# Patient Record
Sex: MALE | Race: WHITE | NOT HISPANIC OR LATINO | URBAN - METROPOLITAN AREA
[De-identification: names, ages, dates, MRNs, and addresses within clinical notes are randomized per-mention and may not be internally consistent; named-entity substitution may affect disease eponyms.]

---

## 2017-09-22 ENCOUNTER — EMERGENCY (EMERGENCY)
Facility: HOSPITAL | Age: 62
LOS: 0 days | Discharge: HOME | End: 2017-09-22

## 2017-09-22 DIAGNOSIS — M25.551 PAIN IN RIGHT HIP: ICD-10-CM

## 2017-09-22 DIAGNOSIS — M54.5 LOW BACK PAIN: ICD-10-CM

## 2017-09-22 DIAGNOSIS — Z90.49 ACQUIRED ABSENCE OF OTHER SPECIFIED PARTS OF DIGESTIVE TRACT: ICD-10-CM
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
## 2017-09-22 DIAGNOSIS — M25.561 PAIN IN RIGHT KNEE: ICD-10-CM
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
## 2019-08-14 PROBLEM — Z00.00 ENCOUNTER FOR PREVENTIVE HEALTH EXAMINATION: Status: ACTIVE | Noted: 2019-08-14

## 2019-08-20 ENCOUNTER — APPOINTMENT (OUTPATIENT)
Dept: SURGERY | Facility: CLINIC | Age: 64
End: 2019-08-20
Payer: COMMERCIAL

## 2019-08-20 VITALS
DIASTOLIC BLOOD PRESSURE: 75 MMHG | WEIGHT: 165 LBS | HEIGHT: 67 IN | BODY MASS INDEX: 25.9 KG/M2 | SYSTOLIC BLOOD PRESSURE: 125 MMHG

## 2019-08-20 DIAGNOSIS — Z86.39 PERSONAL HISTORY OF OTHER ENDOCRINE, NUTRITIONAL AND METABOLIC DISEASE: ICD-10-CM

## 2019-08-20 DIAGNOSIS — R10.32 LEFT LOWER QUADRANT PAIN: ICD-10-CM

## 2019-08-20 PROCEDURE — 99243 OFF/OP CNSLTJ NEW/EST LOW 30: CPT

## 2019-08-20 NOTE — ASSESSMENT
[FreeTextEntry1] : After examination patient was explained about weakness in the left side. Because this symptoms started after exercising patient was advised local care and anti-inflammatory treatment. The patient continues to have problem possibly due affording CAT scan explained to the patient as well. Patient understands it and will either call or return to the office if he is still symptomatic after a while.

## 2019-08-20 NOTE — HISTORY OF PRESENT ILLNESS
[de-identified] : Patient presents to the office with a history of left groin pain after exercising. He had right inguinal hernia surgery done by me and Linda. He also had a left inguinal hernia surgery prior to that.

## 2019-08-20 NOTE — CONSULT LETTER
[Dear  ___] : Dear  [unfilled], [Consult Letter:] : I had the pleasure of evaluating your patient, [unfilled]. [Please see my note below.] : Please see my note below. [Consult Closing:] : Thank you very much for allowing me to participate in the care of this patient.  If you have any questions, please do not hesitate to contact me. [Sincerely,] : Sincerely, [FreeTextEntry3] : Toby Wyatt MD, FACS\par [facility]\[place] [address]\par

## 2019-08-20 NOTE — PHYSICAL EXAM
[No Rash or Lesion] : No rash or lesion [Alert] : alert [Oriented to Person] : oriented to person [Oriented to Place] : oriented to place [Calm] : calm [Oriented to Time] : oriented to time [de-identified] : Left lower quadrant tenderness in the area of the internal ring [de-identified] : normal [de-identified] : No evidence of recurrence of hernia on the right side. Left inguinal region patient has weakness at the internal ring region and some tenderness on coughing. No definite evidence of hernia.

## 2020-02-21 ENCOUNTER — EMERGENCY (EMERGENCY)
Facility: HOSPITAL | Age: 65
LOS: 0 days | Discharge: HOME | End: 2020-02-21
Attending: EMERGENCY MEDICINE | Admitting: EMERGENCY MEDICINE
Payer: COMMERCIAL

## 2020-02-21 VITALS
WEIGHT: 164.91 LBS | DIASTOLIC BLOOD PRESSURE: 73 MMHG | TEMPERATURE: 97 F | HEART RATE: 68 BPM | SYSTOLIC BLOOD PRESSURE: 127 MMHG | OXYGEN SATURATION: 98 % | RESPIRATION RATE: 19 BRPM

## 2020-02-21 DIAGNOSIS — M79.671 PAIN IN RIGHT FOOT: ICD-10-CM

## 2020-02-21 DIAGNOSIS — L02.415 CUTANEOUS ABSCESS OF RIGHT LOWER LIMB: ICD-10-CM

## 2020-02-21 DIAGNOSIS — Z48.00 ENCOUNTER FOR CHANGE OR REMOVAL OF NONSURGICAL WOUND DRESSING: ICD-10-CM

## 2020-02-21 PROCEDURE — 10060 I&D ABSCESS SIMPLE/SINGLE: CPT

## 2020-02-21 PROCEDURE — 99283 EMERGENCY DEPT VISIT LOW MDM: CPT | Mod: 25

## 2020-02-21 RX ORDER — CEPHALEXIN 500 MG
1 CAPSULE ORAL
Qty: 40 | Refills: 0
Start: 2020-02-21 | End: 2020-03-01

## 2020-02-21 NOTE — ED PROVIDER NOTE - PROGRESS NOTE DETAILS
abscess drained with improvement of patient symptoms. patient educated on signs and symptoms to be aware of that should prompt return to the ER. Patient verbalizes understanding. sent rx keflex to pharmacy, educated on importance of starting/completing abx.

## 2020-02-21 NOTE — ED PROVIDER NOTE - NS ED ROS FT
Review of Systems:  	•	CONSTITUTIONAL: no fever, no diaphoresis, no chills  	•	SKIN: +abscess to R foot between 4/5th digit, surrounding redness, no swelling/warmth   	•	HEMATOLOGIC: no bleeding, no bruising  	•	MUSCULOSKELETAL: no joint redness, no joint swelling   	•	NEUROLOGIC: no RLE weakness/numbness/paresthesias

## 2020-02-21 NOTE — ED PROVIDER NOTE - OBJECTIVE STATEMENT
64 year old male, no past medical history, who presents with abscess between R 4th and 5th digit with surrounding redness x4 days. Patient saw PMD who started patient on abx, however, symptoms persisted. Patient states he scratched in between toes, which triggered current symptoms. No fever, chills, foot pain/swelling, ankle pain. Patient ambulating without difficulty.

## 2020-02-21 NOTE — ED PROVIDER NOTE - ATTENDING CONTRIBUTION TO CARE
Pt here for 4d of pain to R foot between 4th and 5h toes.  Originally was athlete's foot but then scratched it and now oozing some pus.  Was on Augmentin and now Clindamycin but when PMD heard oozing pus, asked him to come to ED.    Exam: abscess between toes draining pus, no bony tenderness, no warmth, scant erythema around abscess, no midfoot tenderness, no pedal edema, NAD  Plan: drainage of abscess, more appropriate skin abx coverage

## 2020-02-21 NOTE — ED PROVIDER NOTE - PHYSICAL EXAMINATION
CONST: Well appearing in NAD  MS: Normal ROM in all extremities. FROM of RLE at metatarsals, ankle. pulses 2+. no calf tenderness   SKIN: RLE: small drainable abscess between 4th and 5th metatarsal, serosanguinous drainage appreciated. surrounding erythema, no warmth. no surrounding fluctuance/induration  NEURO: A&Ox3, No focal deficits. Strength 5/5 LE with no sensory deficits. Steady gait

## 2020-02-21 NOTE — ED PROVIDER NOTE - PATIENT PORTAL LINK FT
You can access the FollowMyHealth Patient Portal offered by [facility] by registering at the following website: [email or web address]. By joining [employer]’s FollowMyHealth portal, you will also be able to view your health information using other applications (apps) compatible with our system.

## 2020-02-21 NOTE — ED PROVIDER NOTE - CARE PROVIDER_API CALL
Christy Egan)  Geriatric Medicine; Internal Medicine  [address] [place] [id number]  Phone: [phone]  Fax: [phone]  Established Patient  Follow Up Time: 1-3 Days

## 2021-06-18 NOTE — ED ADULT TRIAGE NOTE - NS ED TRIAGE HISTORIAN
Patient Metronidazole Pregnancy And Lactation Text: This medication is Pregnancy Category B and considered safe during pregnancy.  It is also excreted in breast milk.

## 2022-10-25 ENCOUNTER — APPOINTMENT (OUTPATIENT)
Dept: UROLOGY | Facility: CLINIC | Age: 67
End: 2022-10-25

## 2022-10-28 ENCOUNTER — APPOINTMENT (OUTPATIENT)
Dept: UROLOGY | Facility: CLINIC | Age: 67
End: 2022-10-28

## 2022-10-28 VITALS — HEIGHT: 66 IN | WEIGHT: 157 LBS | BODY MASS INDEX: 25.23 KG/M2 | TEMPERATURE: 98 F | RESPIRATION RATE: 16 BRPM

## 2022-10-28 DIAGNOSIS — E78.00 PURE HYPERCHOLESTEROLEMIA, UNSPECIFIED: ICD-10-CM

## 2022-10-28 PROCEDURE — 99204 OFFICE O/P NEW MOD 45 MIN: CPT

## 2022-10-28 RX ORDER — PAPAVERINE HYDROCHLORIDE 30 MG/ML
30 INJECTION, SOLUTION INTRAVENOUS
Qty: 0.5 | Refills: 0 | Status: ACTIVE | COMMUNITY
Start: 2022-10-28 | End: 1900-01-01

## 2022-10-28 NOTE — HISTORY OF PRESENT ILLNESS
[FreeTextEntry1] : This is a 67 year male who history of ED -- injury to penis during sex 20 years ago -- had peyronies but resolved\par \par takes 100mg sildenafil -- erection 6/10\par wihtout 2/10\par \par tadalafil didn’t work\par \par sex drive is normal\par \par Oct  2022\par PSA- 4.4\par \par PSA was 3.3 last year\par \par prostatitis as a young man

## 2022-12-09 LAB
APPEARANCE: CLEAR
BILIRUBIN URINE: NEGATIVE
BLOOD URINE: NEGATIVE
COLOR: YELLOW
GLUCOSE QUALITATIVE U: NEGATIVE
KETONES URINE: NEGATIVE
LEUKOCYTE ESTERASE URINE: NEGATIVE
NITRITE URINE: NEGATIVE
PH URINE: 6.5
PROTEIN URINE: NORMAL
PSA FREE FLD-MCNC: 14 %
PSA FREE SERPL-MCNC: 0.54 NG/ML
PSA SERPL-MCNC: 3.85 NG/ML
SPECIFIC GRAVITY URINE: 1.03
UROBILINOGEN URINE: NORMAL

## 2022-12-16 ENCOUNTER — APPOINTMENT (OUTPATIENT)
Dept: UROLOGY | Facility: CLINIC | Age: 67
End: 2022-12-16

## 2022-12-16 VITALS
WEIGHT: 160 LBS | HEIGHT: 66 IN | BODY MASS INDEX: 25.71 KG/M2 | HEART RATE: 66 BPM | SYSTOLIC BLOOD PRESSURE: 128 MMHG | DIASTOLIC BLOOD PRESSURE: 68 MMHG

## 2022-12-16 PROCEDURE — 99213 OFFICE O/P EST LOW 20 MIN: CPT

## 2022-12-16 RX ORDER — PAPAVERINE HYDROCHLORIDE 30 MG/ML
30 INJECTION, SOLUTION INTRAVENOUS
Qty: 0.5 | Refills: 0 | Status: ACTIVE | COMMUNITY
Start: 2022-12-16 | End: 1900-01-01

## 2022-12-16 NOTE — HISTORY OF PRESENT ILLNESS
[FreeTextEntry1] : \par This is a 67 year male who history of ED -- injury to penis during sex 20 years ago -- had peyronies but resolved\par \par takes 100mg sildenafil -- erection 6/10\par wihtout 2/10\par \par tadalafil didn’t work\par \par sex drive is normal\par \par Oct 2022\par PSA- 4.4\par \par PSA was 3.3 last year\par \par prostatitis as a young man. \par \par  oct 2022\par Culture - Urine;\par PSA Profile - Total = 3.85\par Urinalysis; neg nit\par \par told to increase sildenafil to 150mg -- improved\par still wishes to try trimx but can't today \par

## 2022-12-16 NOTE — ASSESSMENT
[FreeTextEntry1] : \par This is a 67 year male who history of ED -- injury to penis during sex 20 years ago -- had peyronies but resolved\par \par takes 100mg sildenafil -- erection 6/10\par wihtout 2/10\par \par tadalafil didn’t work\par \par sex drive is normal\par \par Oct 2022\par PSA- 4.4\par \par PSA was 3.3 last year\par \par prostatitis as a young man. \par \par  oct 2022\par Culture - Urine;\par PSA Profile - Total = 3.85\par Urinalysis; neg nit\par \par told to increase sildenafil to 150mg -- improved\par still wishes to try trimx but can't today

## 2023-02-17 ENCOUNTER — APPOINTMENT (OUTPATIENT)
Dept: UROLOGY | Facility: CLINIC | Age: 68
End: 2023-02-17
Payer: COMMERCIAL

## 2023-02-17 VITALS
BODY MASS INDEX: 25.39 KG/M2 | SYSTOLIC BLOOD PRESSURE: 120 MMHG | HEART RATE: 65 BPM | DIASTOLIC BLOOD PRESSURE: 70 MMHG | WEIGHT: 158 LBS | HEIGHT: 66 IN

## 2023-02-17 DIAGNOSIS — R97.20 ELEVATED PROSTATE, SPECIFIC ANTIGEN [PSA]: ICD-10-CM

## 2023-02-17 DIAGNOSIS — N52.9 MALE ERECTILE DYSFUNCTION, UNSPECIFIED: ICD-10-CM

## 2023-02-17 PROCEDURE — 99215 OFFICE O/P EST HI 40 MIN: CPT

## 2023-02-17 NOTE — PHYSICAL EXAM
[General Appearance - In No Acute Distress] : no acute distress [Penis Abnormality] : normal circumcised penis [] : no respiratory distress [Oriented To Time, Place, And Person] : oriented to person, place, and time [Normal Station and Gait] : the gait and station were normal for the patient's age

## 2023-02-28 PROBLEM — N52.9 ERECTILE DYSFUNCTION: Status: ACTIVE | Noted: 2022-10-28

## 2023-02-28 PROBLEM — R97.20 ELEVATED PROSTATE SPECIFIC ANTIGEN (PSA): Status: ACTIVE | Noted: 2022-10-28

## 2023-02-28 NOTE — HISTORY OF PRESENT ILLNESS
[FreeTextEntry1] : Patient is a 67-year-old male with history of erectile dysfunction.  He had injury to penis during sex 20 years ago and has history of Peyronie's disease.\par \par Patient is currently managed on sildenafil 100 mg with moderate erectile response.  He does require the use of constriction band to maintain erection.  Libido is normal.\par \par He presents to office today to learn how to inject Trimix.  He was educated on importance of hygiene, educated on how to prep medication, and shown proper technique of injecting medication intracavernosal.\par Today patient injected Trimix 30 – 1 – 10 0.2 mL.  Skin prepped prior to injection and pressure held after injection.  Patient tolerated injection well.\par \par History of elevated PSA in October 2022 4.4 ng/mL with repeat of 3.85 ng/mL.  He is due for repeat PSA in October 2023.

## 2023-02-28 NOTE — ASSESSMENT
[FreeTextEntry1] : 67-year-old with erectile dysfunction currently managed on sildenafil 150 mg and constriction band.  Patient states that he does get moderate improvement in erection but is unable to maintain erection longer than 10 minutes.  He presents today to try Trimix injection.  Patient injected himself today right side intracavernosal with 0.2 mL of Trimix 30 – 1 – 10.  15 minutes postinjection patient had noticeable fullness, no firm erection.\par \par Discussed prescribed medication for at home and starting at 0.3 mL.  Patient can increase by 0.05 mL as needed.  Emergency precautions reviewed including painful erections and erections that last longer than 4 hours.\par Patient also understands that he should not inject more than every 3 days as needed and he should alternate sides of injection.\par After discussion, including cost of medication, patient would like to discuss further with his wife prior to a prescription being sent to pharmacy.\par \par We will set preliminary appointment for 6 to 8 weeks if patient does want a prescription so that we may assess his response.\par If patient does not want a prescription he understands he is due for repeat PSA in October 2023.

## 2023-04-14 ENCOUNTER — APPOINTMENT (OUTPATIENT)
Dept: UROLOGY | Facility: CLINIC | Age: 68
End: 2023-04-14

## 2024-01-25 ENCOUNTER — APPOINTMENT (OUTPATIENT)
Dept: NEUROLOGY | Facility: CLINIC | Age: 69
End: 2024-01-25
Payer: COMMERCIAL

## 2024-01-25 VITALS
WEIGHT: 162 LBS | HEIGHT: 66 IN | BODY MASS INDEX: 26.03 KG/M2 | SYSTOLIC BLOOD PRESSURE: 128 MMHG | HEART RATE: 67 BPM | DIASTOLIC BLOOD PRESSURE: 73 MMHG

## 2024-01-25 PROCEDURE — 99204 OFFICE O/P NEW MOD 45 MIN: CPT

## 2024-01-25 RX ORDER — GABAPENTIN 300 MG/1
300 CAPSULE ORAL 3 TIMES DAILY
Qty: 90 | Refills: 2 | Status: ACTIVE | COMMUNITY
Start: 2024-01-25 | End: 1900-01-01

## 2024-02-08 ENCOUNTER — APPOINTMENT (OUTPATIENT)
Dept: NEUROLOGY | Facility: CLINIC | Age: 69
End: 2024-02-08
Payer: COMMERCIAL

## 2024-02-08 PROCEDURE — 95911 NRV CNDJ TEST 9-10 STUDIES: CPT

## 2024-02-08 PROCEDURE — 95886 MUSC TEST DONE W/N TEST COMP: CPT

## 2024-02-29 ENCOUNTER — APPOINTMENT (OUTPATIENT)
Dept: NEUROLOGY | Facility: CLINIC | Age: 69
End: 2024-02-29
Payer: COMMERCIAL

## 2024-02-29 VITALS
SYSTOLIC BLOOD PRESSURE: 126 MMHG | DIASTOLIC BLOOD PRESSURE: 64 MMHG | WEIGHT: 162 LBS | HEART RATE: 63 BPM | BODY MASS INDEX: 26.03 KG/M2 | HEIGHT: 66 IN

## 2024-02-29 DIAGNOSIS — M54.12 RADICULOPATHY, CERVICAL REGION: ICD-10-CM

## 2024-02-29 PROCEDURE — 99214 OFFICE O/P EST MOD 30 MIN: CPT

## 2024-02-29 NOTE — ASSESSMENT
[FreeTextEntry1] : Bilateral carpal tunnel syndrome-worse on the left -Patient is being referred to an orthopedic hand specialist to trial carpal tunnel steroid injections.   Cervical spondylosis -physical therapy -We discussed the possibility of seeing a neurosurgeon for the spondylosis causing cord impingement  Total clinician time spent  is  31 minutes including preparing to see the patient, obtaining and/or reviewing and confirming history, performing a medically necessary and appropriate examination, counseling and educating the patient and/or family, documenting clinical information in the HER and communicating and/or referring to other healthcare professionals.   I, Daina Butcher, attest that this documentation has been prepared under the direction and in the presence of Provider Juan Mendosa DO  Thank you for allowing me to assist in the management of this patient.  Juan Mendosa DO Board Certified, Neurology.

## 2024-02-29 NOTE — PHYSICAL EXAM
[Person] : oriented to person [Place] : oriented to place [Time] : oriented to time [Concentration Intact] : normal concentrating ability [Naming Objects] : no difficulty naming common objects [Visual Intact] : visual attention was ~T not ~L decreased [Repeating Phrases] : no difficulty repeating a phrase [Writing A Sentence] : no difficulty writing a sentence [Fluency] : fluency intact [Comprehension] : comprehension intact [Reading] : reading intact [Past History] : adequate knowledge of personal past history [Cranial Nerves Optic (II)] : visual acuity intact bilaterally,  visual fields full to confrontation, pupils equal round and reactive to light [Cranial Nerves Oculomotor (III)] : extraocular motion intact [Cranial Nerves Trigeminal (V)] : facial sensation intact symmetrically [Cranial Nerves Facial (VII)] : face symmetrical [Cranial Nerves Accessory (XI - Cranial And Spinal)] : head turning and shoulder shrug symmetric [Cranial Nerves Glossopharyngeal (IX)] : tongue and palate midline [Cranial Nerves Vestibulocochlear (VIII)] : hearing was intact bilaterally [Motor Tone] : muscle tone was normal in all four extremities [Cranial Nerves Hypoglossal (XII)] : there was no tongue deviation with protrusion [No Muscle Atrophy] : normal bulk in all four extremities [Motor Strength] : muscle strength was normal in all four extremities [Balance] : balance was intact [Abnormal Walk] : normal gait [2+] : Ankle jerk left 2+ [Tremor] : no tremor present [Past-pointing] : there was no past-pointing [Plantar Reflex Right Only] : normal on the right [Plantar Reflex Left Only] : normal on the left [FreeTextEntry7] : Positive Phalen sign at left wrist

## 2024-02-29 NOTE — HISTORY OF PRESENT ILLNESS
[FreeTextEntry1] : ORIGINAL PRESENTATION: It's a pleasure to see Mr. GABBI CARDOSO In the office today. He is a 68-year-old man who works as the  at the [employer] who presents to the office today for the evaluation of left hand pain.  He reports that this has been going on for over 2 years.  Initially he was diagnosed with bilateral carpal tunnel syndrome clinically without the use of EMG/NCS.  He went for surgery in the right hand which did take away all his symptoms, however surgery in the left wrist did not help him at all.  He continues to have burning sensation and tingling especially at night that wakes him up.  Wearing a wrist brace did not help at all either.  He has noticed simple changing position from standing to sitting or bending his neck can trigger the symptoms in his hands as well and initially he did have all of the fingers affected but the pinky has gotten better since and only the first 3 and half fingers are numb and painful.  He denies any weakness, urinary/bowel incontinence.  He was sent for x-ray of the cervical spine which supposedly showed degenerative arthritis but the result of which is not available to us at this time his doctor wanted to send him for MRI of the cervical spine which was denying until he has done therapy.  He started doing physical therapy for his neck and had cupping in his forearm in [place] which he did not find helpful.   TODAY: Patient presents for a neurological follow up. His previous history and physical findings have been reviewed. Patient primary complains of pain and numbness in the left hand. We reviewed his workup in detail. MRI of the cervical spine dated 2/9/24 revealed C3-4, C4-5, C5-6, and C6-7 disc bulges with moderate spinal canal stenosis throughout each level. EMG/NCV showed bilateral carpal tunnel syndrome. Patient has never trialed physical therapy for the hand which helped manage his symptoms. The numbness in the fingers of the left hand still persists which is most bothersome to the patient.

## 2024-05-02 ENCOUNTER — APPOINTMENT (OUTPATIENT)
Dept: NEUROLOGY | Facility: CLINIC | Age: 69
End: 2024-05-02

## 2024-05-24 ENCOUNTER — APPOINTMENT (OUTPATIENT)
Dept: PAIN MANAGEMENT | Facility: CLINIC | Age: 69
End: 2024-05-24
Payer: COMMERCIAL

## 2024-05-24 PROCEDURE — 99204 OFFICE O/P NEW MOD 45 MIN: CPT

## 2024-05-24 PROCEDURE — 99203 OFFICE O/P NEW LOW 30 MIN: CPT

## 2024-05-24 RX ORDER — MELOXICAM 15 MG/1
15 TABLET ORAL
Qty: 30 | Refills: 0 | Status: ACTIVE | COMMUNITY
Start: 2024-05-24 | End: 1900-01-01

## 2024-05-28 NOTE — DATA REVIEWED
[FreeTextEntry1] : EMG of the upper extremities taken on 2-8-2024 showed compression of both media nerves at the wrist, consistent with bilateral carpal tunnel syndrome, left greater than right.

## 2024-05-28 NOTE — PHYSICAL EXAM
[de-identified] : Constitutional:  well developed, well nourished, no acute distress.  Eyes:  normal conjunctiva.  Neurological:  moves all extremities, no focal deficits, normal speech.  Psychiatric:  alert and oriented, normal memory.   L hand  scar noted. well healed

## 2024-05-28 NOTE — DISCUSSION/SUMMARY
[de-identified] : A discussion regarding available pain management treatment options occurred with the patient. These included interventional, rehabilitative, pharmacological, and alternative modalities. We will proceed with the following:   Interventional treatment options: - none indicated at this time.   I will refer patient to see Dr. Santos (hand surgeon) to discuss his treatment options.   Medications: 1. Ordered Meloxicam 15 mg daily as needed.   I advised the patient that the NSAID should be taken with food.  In addition while taking the prescribed NSAID, no over the counter or other NSAIDs should be used, such as ibuprofen (Motrin or Advil) or naproxen (Aleve) as this can cause stomach upset or other side effects.  If needed for fever or breakthrough pain Tylenol can be used.   - Follow up as needed   I Su Dillon attest that this documentation has been prepared under the direction and in the presence of provider Dr. Salomon Gracia.  The documentation recorded by the scribe in my presence, accurately reflects the service I personally performed, and the decisions made by me with my edits as appropriate.   Salomon Gracia, DO

## 2024-05-28 NOTE — HISTORY OF PRESENT ILLNESS
[FreeTextEntry1] : Mr. Guillory is a 68 year old male presenting to establish care for her carpal tunnel syndrome. He was diagnosed by Dr. Saldivar many years ago. He underwent surgical correction for his carpal tunnel. Post-operatively, he started to develop more pain. He continues to numbness and tingling along with a burning like sensation. His 3rd and 4th digits are the most bothersome. He has constant paint 24/hrs a day, 7days a week. Dr. Saldivar did want to go back in to see if there was something else that could be done. He underwent 2 steroid shots post-op which did not provide him with any sustained benefit. He did also trial a Medrol dos nikky, twice, which did relieve his pain however the pain returned shortly after.  [facility]

## 2024-06-07 ENCOUNTER — APPOINTMENT (OUTPATIENT)
Dept: ORTHOPEDIC SURGERY | Facility: CLINIC | Age: 69
End: 2024-06-07
Payer: COMMERCIAL

## 2024-06-07 ENCOUNTER — TRANSCRIPTION ENCOUNTER (OUTPATIENT)
Age: 69
End: 2024-06-07

## 2024-06-07 VITALS — HEIGHT: 66 IN | BODY MASS INDEX: 26.2 KG/M2 | WEIGHT: 163 LBS

## 2024-06-07 DIAGNOSIS — G56.02 CARPAL TUNNEL SYNDROME, LEFT UPPER LIMB: ICD-10-CM

## 2024-06-07 PROCEDURE — 99202 OFFICE O/P NEW SF 15 MIN: CPT

## 2024-06-07 NOTE — DATA REVIEWED
[FreeTextEntry1] : EMG test was performed documenting significant carpal tunnel with no response to the sensory exam

## 2024-06-07 NOTE — ASSESSMENT
[FreeTextEntry1] : Patient has left-sided carpal tunnel syndrome.  He had release done.  His symptoms are not much different in terms of the numbness in the fingers but he does have abnormal 2-point discrimination the possibility of exploration was discussed the possibility of nerve repair was discussed he is going to try desensitization program is to take this under consideration I am going to have him see Dr. Sorenson in the future

## 2024-06-07 NOTE — PHYSICAL EXAM
[de-identified] : Patient has diminished sensation in the median nerve distribution on the left hand.  He does have good thenar strength with no thenar atrophy he has abnormal 2-point discrimination in the median nerve distribution on the left hand and really absent sensation to attempts at 2-point discrimination on the radial aspect of the ring finger and the middle finger entirely

## 2024-06-07 NOTE — HISTORY OF PRESENT ILLNESS
[de-identified] : 68-year-old male had a recent carpal tunnel release done on the left side.  This was done by an outside orthopedic surgeon.  He had lots of numbness and burning pain and discomfort prior to the surgery.  No EMG test was done prior to the surgery he reports.  And he comes in today for evaluation because of numbness that is persistent in his fingers.  He is saying that the numbness is not any worse than it was prior to the surgery if he keeps his hand in a dependent position he does not have EXTR numbness is his arm he does have extra numbness he did have significant pain discomfort after surgery he took steroids and felt better from that

## 2024-07-24 ENCOUNTER — APPOINTMENT (OUTPATIENT)
Dept: ORTHOPEDIC SURGERY | Facility: CLINIC | Age: 69
End: 2024-07-24

## 2024-08-06 ENCOUNTER — APPOINTMENT (OUTPATIENT)
Dept: ORTHOPEDIC SURGERY | Facility: CLINIC | Age: 69
End: 2024-08-06

## 2024-08-06 PROCEDURE — 99204 OFFICE O/P NEW MOD 45 MIN: CPT

## 2024-08-08 NOTE — ASSESSMENT
[FreeTextEntry1] : Patient comes in with complaints of numbness and tingling in the left hand.  He said he had a carpal tunnel release done on November 1.  He said after the surgery he was having more numbness specifically in the third webspace.  He said he was having more pain as well.  He was given Medrol Dosepak twice afterwards.  He was also given 2 steroid injections.  He says the pain is not as bad as it was and not as intense.  He uses a splint at night.  He still is having discomfort and loss of sensation.  He does not have other complaints today.  L hand:  Tender volar wrist  Good finger ROM  +Tinels  +Phalens  +Compression test  Decreased sensation median nerve distribution, Especially third webspace  The patient was advised of the diagnosis.  The natural history of the pathology was explained in full to the patient in layman's terms. We discussed the nature of the nerve as an electrical cable and what happens to the nerve in carpal tunnel syndrome.  We discussed that treatment for night symptoms included night bracing.  We discussed the possibility of injection when symptoms were intermittent or in patients who were unwilling to undergo surgery with constant symptoms.  We discussed that injection is a diagnostic and therapeutic aide and what this means.  We discussed the use of nerve testing in cases when diagnosis was in doubt or for confirmation to exclude alternate pathology.  We discussed that if symptoms were 24/7 surgery was recommended to give the nerve the best chance to recover but that once symptoms were constant, the nerve may not recover even with surgery.  We discussed that if left alone the nerve progression could worsen and that treatment was indicated to prevent progression of nerve compression.  The longer the nerve is left, the more likely to cause worsening irreversible damage.  All questions were answered.  The risks and benefits of surgical and non-surgical treatment alternatives were explained in full to the patient.  The patient had carpal tunnel release.  He is having more pain after the surgery but that has subsided.  There is a possibility that he could have had an incomplete release he could have scarring around the nerve there is also possibility he had an injury to the nerve.  At this point my recommendation for the patient is considering a revision carpal tunnel release.  It would be an exploration of the nerve possible repair possible nerve wrap.  I discussed risk benefits alternatives of surgery with the patient.  He would like to move forward with surgical intervention understanding that his symptoms may not change. Risks, benefits, and alternatives of surgery discussed with patient (and family). Risks including but not limited to infection, blood loss, tendon damage, muscle damage, nerve damage, stiffness, pain, no resolution of symptoms, loss of function, potential for secondary surgery, loss of limb or life. Patient understands and was allowed to voice questions or concerns. They agree to surgery.

## 2024-09-26 ENCOUNTER — APPOINTMENT (OUTPATIENT)
Dept: ORTHOPEDIC SURGERY | Facility: AMBULATORY SURGERY CENTER | Age: 69
End: 2024-09-26

## 2024-10-08 ENCOUNTER — APPOINTMENT (OUTPATIENT)
Dept: ORTHOPEDIC SURGERY | Facility: CLINIC | Age: 69
End: 2024-10-08

## 2025-03-04 ENCOUNTER — NON-APPOINTMENT (OUTPATIENT)
Age: 70
End: 2025-03-04

## 2025-03-04 ENCOUNTER — APPOINTMENT (OUTPATIENT)
Dept: CARDIOLOGY | Facility: CLINIC | Age: 70
End: 2025-03-04
Payer: MEDICARE

## 2025-03-04 VITALS
BODY MASS INDEX: 25.89 KG/M2 | DIASTOLIC BLOOD PRESSURE: 68 MMHG | WEIGHT: 163 LBS | OXYGEN SATURATION: 99 % | HEIGHT: 66.5 IN | HEART RATE: 64 BPM | SYSTOLIC BLOOD PRESSURE: 122 MMHG

## 2025-03-04 DIAGNOSIS — I65.21 OCCLUSION AND STENOSIS OF RIGHT CAROTID ARTERY: ICD-10-CM

## 2025-03-04 DIAGNOSIS — Z87.891 PERSONAL HISTORY OF NICOTINE DEPENDENCE: ICD-10-CM

## 2025-03-04 DIAGNOSIS — Z78.9 OTHER SPECIFIED HEALTH STATUS: ICD-10-CM

## 2025-03-04 DIAGNOSIS — E78.00 PURE HYPERCHOLESTEROLEMIA, UNSPECIFIED: ICD-10-CM

## 2025-03-04 DIAGNOSIS — Z82.49 FAMILY HISTORY OF ISCHEMIC HEART DISEASE AND OTHER DISEASES OF THE CIRCULATORY SYSTEM: ICD-10-CM

## 2025-03-04 DIAGNOSIS — R94.31 ABNORMAL ELECTROCARDIOGRAM [ECG] [EKG]: ICD-10-CM

## 2025-03-04 DIAGNOSIS — R01.1 CARDIAC MURMUR, UNSPECIFIED: ICD-10-CM

## 2025-03-04 DIAGNOSIS — I45.2 BIFASCICULAR BLOCK: ICD-10-CM

## 2025-03-04 DIAGNOSIS — N52.9 MALE ERECTILE DYSFUNCTION, UNSPECIFIED: ICD-10-CM

## 2025-03-04 DIAGNOSIS — R07.9 CHEST PAIN, UNSPECIFIED: ICD-10-CM

## 2025-03-04 PROCEDURE — 99204 OFFICE O/P NEW MOD 45 MIN: CPT

## 2025-03-04 PROCEDURE — 93000 ELECTROCARDIOGRAM COMPLETE: CPT

## 2025-03-04 RX ORDER — METOPROLOL TARTRATE 25 MG/1
25 TABLET ORAL
Qty: 1 | Refills: 0 | Status: ACTIVE | COMMUNITY
Start: 2025-03-04 | End: 1900-01-01

## 2025-03-25 ENCOUNTER — RESULT REVIEW (OUTPATIENT)
Age: 70
End: 2025-03-25

## 2025-03-25 ENCOUNTER — OUTPATIENT (OUTPATIENT)
Dept: OUTPATIENT SERVICES | Facility: HOSPITAL | Age: 70
LOS: 1 days | End: 2025-03-25
Payer: MEDICARE

## 2025-03-25 DIAGNOSIS — R94.31 ABNORMAL ELECTROCARDIOGRAM [ECG] [EKG]: ICD-10-CM

## 2025-03-25 DIAGNOSIS — Z00.8 ENCOUNTER FOR OTHER GENERAL EXAMINATION: ICD-10-CM

## 2025-03-25 PROCEDURE — 75574 CT ANGIO HRT W/3D IMAGE: CPT

## 2025-03-25 PROCEDURE — 75574 CT ANGIO HRT W/3D IMAGE: CPT | Mod: 26

## 2025-03-26 DIAGNOSIS — R94.31 ABNORMAL ELECTROCARDIOGRAM [ECG] [EKG]: ICD-10-CM

## 2025-03-27 DIAGNOSIS — I25.10 ATHEROSCLEROTIC HEART DISEASE OF NATIVE CORONARY ARTERY W/OUT ANGINA PECTORIS: ICD-10-CM

## 2025-03-27 DIAGNOSIS — E78.5 HYPERLIPIDEMIA, UNSPECIFIED: ICD-10-CM

## 2025-03-27 RX ORDER — ROSUVASTATIN CALCIUM 20 MG/1
20 TABLET, FILM COATED ORAL DAILY
Qty: 90 | Refills: 3 | Status: ACTIVE | COMMUNITY
Start: 2025-03-27 | End: 1900-01-01

## 2025-03-31 ENCOUNTER — OUTPATIENT (OUTPATIENT)
Dept: OUTPATIENT SERVICES | Facility: HOSPITAL | Age: 70
LOS: 1 days | End: 2025-03-31
Payer: MEDICARE

## 2025-03-31 ENCOUNTER — RESULT REVIEW (OUTPATIENT)
Age: 70
End: 2025-03-31

## 2025-03-31 DIAGNOSIS — R93.1 ABNORMAL FINDINGS ON DIAGNOSTIC IMAGING OF HEART AND CORONARY CIRCULATION: ICD-10-CM

## 2025-03-31 LAB
ALBUMIN SERPL ELPH-MCNC: 4.2 G/DL
ALP BLD-CCNC: 118 U/L
ALT SERPL-CCNC: 13 U/L
ANION GAP SERPL CALC-SCNC: 12 MMOL/L
APTT BLD: 39.3 SEC
AST SERPL-CCNC: 20 U/L
BASOPHILS # BLD AUTO: 0.06 K/UL
BASOPHILS NFR BLD AUTO: 1.7 %
BILIRUB SERPL-MCNC: 1.3 MG/DL
BUN SERPL-MCNC: 12 MG/DL
CALCIUM SERPL-MCNC: 9.5 MG/DL
CHLORIDE SERPL-SCNC: 104 MMOL/L
CO2 SERPL-SCNC: 25 MMOL/L
CREAT SERPL-MCNC: 1 MG/DL
EGFRCR SERPLBLD CKD-EPI 2021: 80 ML/MIN/1.73M2
EOSINOPHIL # BLD AUTO: 0.12 K/UL
EOSINOPHIL NFR BLD AUTO: 3.4 %
GLUCOSE SERPL-MCNC: 79 MG/DL
HCT VFR BLD CALC: 43.7 %
HGB BLD-MCNC: 14.4 G/DL
IMM GRANULOCYTES NFR BLD AUTO: 0.3 %
INR PPP: 0.9 RATIO
LYMPHOCYTES # BLD AUTO: 0.85 K/UL
LYMPHOCYTES NFR BLD AUTO: 24.4 %
MAN DIFF?: NORMAL
MCHC RBC-ENTMCNC: 30.1 PG
MCHC RBC-ENTMCNC: 33 G/DL
MCV RBC AUTO: 91.2 FL
MONOCYTES # BLD AUTO: 0.39 K/UL
MONOCYTES NFR BLD AUTO: 11.2 %
NEUTROPHILS # BLD AUTO: 2.06 K/UL
NEUTROPHILS NFR BLD AUTO: 59 %
PLATELET # BLD AUTO: 159 K/UL
PMV BLD AUTO: 0 /100 WBCS
PMV BLD: 9.8 FL
POTASSIUM SERPL-SCNC: 4.1 MMOL/L
PROT SERPL-MCNC: 7.1 G/DL
PT BLD: 10.6 SEC
RBC # BLD: 4.79 M/UL
RBC # FLD: 13.6 %
SODIUM SERPL-SCNC: 141 MMOL/L
WBC # FLD AUTO: 3.49 K/UL

## 2025-03-31 PROCEDURE — 75580 N-INVAS EST C FFR SW ALY CTA: CPT | Mod: 26

## 2025-03-31 PROCEDURE — 75580 N-INVAS EST C FFR SW ALY CTA: CPT

## 2025-04-01 DIAGNOSIS — R93.1 ABNORMAL FINDINGS ON DIAGNOSTIC IMAGING OF HEART AND CORONARY CIRCULATION: ICD-10-CM

## 2025-04-20 ENCOUNTER — INPATIENT (INPATIENT)
Facility: HOSPITAL | Age: 70
LOS: 4 days | Discharge: HOME | DRG: 313 | End: 2025-04-25
Attending: STUDENT IN AN ORGANIZED HEALTH CARE EDUCATION/TRAINING PROGRAM | Admitting: STUDENT IN AN ORGANIZED HEALTH CARE EDUCATION/TRAINING PROGRAM
Payer: MEDICARE

## 2025-04-20 VITALS
HEIGHT: 77 IN | OXYGEN SATURATION: 99 % | DIASTOLIC BLOOD PRESSURE: 71 MMHG | HEART RATE: 65 BPM | WEIGHT: 164.91 LBS | TEMPERATURE: 98 F | RESPIRATION RATE: 20 BRPM | SYSTOLIC BLOOD PRESSURE: 149 MMHG

## 2025-04-20 DIAGNOSIS — R07.9 CHEST PAIN, UNSPECIFIED: ICD-10-CM

## 2025-04-20 LAB
ALBUMIN SERPL ELPH-MCNC: 4.5 G/DL — SIGNIFICANT CHANGE UP (ref 3.5–5.2)
ALP SERPL-CCNC: 130 U/L — HIGH (ref 30–115)
ALT FLD-CCNC: 20 U/L — SIGNIFICANT CHANGE UP (ref 0–41)
ANION GAP SERPL CALC-SCNC: 8 MMOL/L — SIGNIFICANT CHANGE UP (ref 7–14)
APTT BLD: 35.5 SEC — SIGNIFICANT CHANGE UP (ref 27–39.2)
AST SERPL-CCNC: 22 U/L — SIGNIFICANT CHANGE UP (ref 0–41)
BASOPHILS # BLD AUTO: 0.04 K/UL — SIGNIFICANT CHANGE UP (ref 0–0.2)
BASOPHILS NFR BLD AUTO: 1 % — SIGNIFICANT CHANGE UP (ref 0–1)
BILIRUB SERPL-MCNC: 1.1 MG/DL — SIGNIFICANT CHANGE UP (ref 0.2–1.2)
BUN SERPL-MCNC: 14 MG/DL — SIGNIFICANT CHANGE UP (ref 10–20)
CALCIUM SERPL-MCNC: 9.3 MG/DL — SIGNIFICANT CHANGE UP (ref 8.4–10.5)
CHLORIDE SERPL-SCNC: 102 MMOL/L — SIGNIFICANT CHANGE UP (ref 98–110)
CO2 SERPL-SCNC: 26 MMOL/L — SIGNIFICANT CHANGE UP (ref 17–32)
CREAT SERPL-MCNC: 1 MG/DL — SIGNIFICANT CHANGE UP (ref 0.7–1.5)
EGFR: 80 ML/MIN/1.73M2 — SIGNIFICANT CHANGE UP
EGFR: 80 ML/MIN/1.73M2 — SIGNIFICANT CHANGE UP
EOSINOPHIL # BLD AUTO: 0.12 K/UL — SIGNIFICANT CHANGE UP (ref 0–0.7)
EOSINOPHIL NFR BLD AUTO: 3.1 % — SIGNIFICANT CHANGE UP (ref 0–8)
GLUCOSE SERPL-MCNC: 65 MG/DL — LOW (ref 70–99)
HCT VFR BLD CALC: 27.4 % — LOW (ref 42–52)
HCT VFR BLD CALC: 30.8 % — LOW (ref 42–52)
HGB BLD-MCNC: 10.5 G/DL — LOW (ref 14–18)
HGB BLD-MCNC: 9.4 G/DL — LOW (ref 14–18)
IMM GRANULOCYTES NFR BLD AUTO: 0.5 % — HIGH (ref 0.1–0.3)
INR BLD: 0.92 RATIO — SIGNIFICANT CHANGE UP (ref 0.65–1.3)
IRON SATN MFR SERPL: 12 % — LOW (ref 15–50)
IRON SATN MFR SERPL: 39 UG/DL — SIGNIFICANT CHANGE UP (ref 35–150)
LYMPHOCYTES # BLD AUTO: 0.75 K/UL — LOW (ref 1.2–3.4)
LYMPHOCYTES # BLD AUTO: 19.4 % — LOW (ref 20.5–51.1)
MAGNESIUM SERPL-MCNC: 2.2 MG/DL — SIGNIFICANT CHANGE UP (ref 1.8–2.4)
MCHC RBC-ENTMCNC: 30 PG — SIGNIFICANT CHANGE UP (ref 27–31)
MCHC RBC-ENTMCNC: 30.2 PG — SIGNIFICANT CHANGE UP (ref 27–31)
MCHC RBC-ENTMCNC: 34.1 G/DL — SIGNIFICANT CHANGE UP (ref 32–37)
MCHC RBC-ENTMCNC: 34.3 G/DL — SIGNIFICANT CHANGE UP (ref 32–37)
MCV RBC AUTO: 88 FL — SIGNIFICANT CHANGE UP (ref 80–94)
MCV RBC AUTO: 88.1 FL — SIGNIFICANT CHANGE UP (ref 80–94)
MONOCYTES # BLD AUTO: 0.42 K/UL — SIGNIFICANT CHANGE UP (ref 0.1–0.6)
MONOCYTES NFR BLD AUTO: 10.9 % — HIGH (ref 1.7–9.3)
NEUTROPHILS # BLD AUTO: 2.51 K/UL — SIGNIFICANT CHANGE UP (ref 1.4–6.5)
NEUTROPHILS NFR BLD AUTO: 65.1 % — SIGNIFICANT CHANGE UP (ref 42.2–75.2)
NRBC BLD AUTO-RTO: 0 /100 WBCS — SIGNIFICANT CHANGE UP (ref 0–0)
NRBC BLD AUTO-RTO: 0 /100 WBCS — SIGNIFICANT CHANGE UP (ref 0–0)
PLATELET # BLD AUTO: 166 K/UL — SIGNIFICANT CHANGE UP (ref 130–400)
PLATELET # BLD AUTO: 172 K/UL — SIGNIFICANT CHANGE UP (ref 130–400)
PMV BLD: 9.1 FL — SIGNIFICANT CHANGE UP (ref 7.4–10.4)
PMV BLD: 9.7 FL — SIGNIFICANT CHANGE UP (ref 7.4–10.4)
POTASSIUM SERPL-MCNC: 3.9 MMOL/L — SIGNIFICANT CHANGE UP (ref 3.5–5)
POTASSIUM SERPL-SCNC: 3.9 MMOL/L — SIGNIFICANT CHANGE UP (ref 3.5–5)
PROT SERPL-MCNC: 6.7 G/DL — SIGNIFICANT CHANGE UP (ref 6–8)
PROTHROM AB SERPL-ACNC: 10.8 SEC — SIGNIFICANT CHANGE UP (ref 9.95–12.87)
RBC # BLD: 3.11 M/UL — LOW (ref 4.7–6.1)
RBC # BLD: 3.5 M/UL — LOW (ref 4.7–6.1)
RBC # FLD: 13.5 % — SIGNIFICANT CHANGE UP (ref 11.5–14.5)
RBC # FLD: 13.5 % — SIGNIFICANT CHANGE UP (ref 11.5–14.5)
SODIUM SERPL-SCNC: 136 MMOL/L — SIGNIFICANT CHANGE UP (ref 135–146)
TIBC SERPL-MCNC: 317 UG/DL — SIGNIFICANT CHANGE UP (ref 220–430)
TROPONIN T, HIGH SENSITIVITY RESULT: 7 NG/L — SIGNIFICANT CHANGE UP (ref 6–21)
TROPONIN T, HIGH SENSITIVITY RESULT: <6 NG/L — SIGNIFICANT CHANGE UP (ref 6–21)
UIBC SERPL-MCNC: 278 UG/DL — SIGNIFICANT CHANGE UP (ref 110–370)
WBC # BLD: 3.62 K/UL — LOW (ref 4.8–10.8)
WBC # BLD: 3.86 K/UL — LOW (ref 4.8–10.8)
WBC # FLD AUTO: 3.62 K/UL — LOW (ref 4.8–10.8)
WBC # FLD AUTO: 3.86 K/UL — LOW (ref 4.8–10.8)

## 2025-04-20 PROCEDURE — 86901 BLOOD TYPING SEROLOGIC RH(D): CPT

## 2025-04-20 PROCEDURE — 83735 ASSAY OF MAGNESIUM: CPT

## 2025-04-20 PROCEDURE — C1769: CPT

## 2025-04-20 PROCEDURE — 93454 CORONARY ARTERY ANGIO S&I: CPT | Mod: 59

## 2025-04-20 PROCEDURE — 80061 LIPID PANEL: CPT

## 2025-04-20 PROCEDURE — 80048 BASIC METABOLIC PNL TOTAL CA: CPT

## 2025-04-20 PROCEDURE — C1887: CPT

## 2025-04-20 PROCEDURE — 85730 THROMBOPLASTIN TIME PARTIAL: CPT

## 2025-04-20 PROCEDURE — 85025 COMPLETE CBC W/AUTO DIFF WBC: CPT

## 2025-04-20 PROCEDURE — 99291 CRITICAL CARE FIRST HOUR: CPT

## 2025-04-20 PROCEDURE — 83540 ASSAY OF IRON: CPT

## 2025-04-20 PROCEDURE — 36415 COLL VENOUS BLD VENIPUNCTURE: CPT

## 2025-04-20 PROCEDURE — 86900 BLOOD TYPING SEROLOGIC ABO: CPT

## 2025-04-20 PROCEDURE — 82728 ASSAY OF FERRITIN: CPT

## 2025-04-20 PROCEDURE — 93306 TTE W/DOPPLER COMPLETE: CPT

## 2025-04-20 PROCEDURE — 85027 COMPLETE CBC AUTOMATED: CPT

## 2025-04-20 PROCEDURE — 84443 ASSAY THYROID STIM HORMONE: CPT

## 2025-04-20 PROCEDURE — C1874: CPT

## 2025-04-20 PROCEDURE — 93005 ELECTROCARDIOGRAM TRACING: CPT

## 2025-04-20 PROCEDURE — 83550 IRON BINDING TEST: CPT

## 2025-04-20 PROCEDURE — C1725: CPT

## 2025-04-20 PROCEDURE — 88312 SPECIAL STAINS GROUP 1: CPT

## 2025-04-20 PROCEDURE — 83036 HEMOGLOBIN GLYCOSYLATED A1C: CPT

## 2025-04-20 PROCEDURE — 84484 ASSAY OF TROPONIN QUANT: CPT

## 2025-04-20 PROCEDURE — 99285 EMERGENCY DEPT VISIT HI MDM: CPT | Mod: FS

## 2025-04-20 PROCEDURE — 93799 UNLISTED CV SVC/PROCEDURE: CPT

## 2025-04-20 PROCEDURE — 71045 X-RAY EXAM CHEST 1 VIEW: CPT | Mod: 26

## 2025-04-20 PROCEDURE — 85610 PROTHROMBIN TIME: CPT

## 2025-04-20 PROCEDURE — 86850 RBC ANTIBODY SCREEN: CPT

## 2025-04-20 PROCEDURE — 80053 COMPREHEN METABOLIC PANEL: CPT

## 2025-04-20 PROCEDURE — 88305 TISSUE EXAM BY PATHOLOGIST: CPT

## 2025-04-20 PROCEDURE — C9600: CPT | Mod: LD

## 2025-04-20 PROCEDURE — C1894: CPT

## 2025-04-20 RX ORDER — INFLUENZA A VIRUS A/IDAHO/07/2018 (H1N1) ANTIGEN (MDCK CELL DERIVED, PROPIOLACTONE INACTIVATED, INFLUENZA A VIRUS A/INDIANA/08/2018 (H3N2) ANTIGEN (MDCK CELL DERIVED, PROPIOLACTONE INACTIVATED), INFLUENZA B VIRUS B/SINGAPORE/INFTT-16-0610/2016 ANTIGEN (MDCK CELL DERIVED, PROPIOLACTONE INACTIVATED), INFLUENZA B VIRUS B/IOWA/06/2017 ANTIGEN (MDCK CELL DERIVED, PROPIOLACTONE INACTIVATED) 15; 15; 15; 15 UG/.5ML; UG/.5ML; UG/.5ML; UG/.5ML
0.5 INJECTION, SUSPENSION INTRAMUSCULAR ONCE
Refills: 0 | Status: DISCONTINUED | OUTPATIENT
Start: 2025-04-20 | End: 2025-04-25

## 2025-04-20 RX ORDER — ATORVASTATIN CALCIUM 80 MG/1
80 TABLET, FILM COATED ORAL AT BEDTIME
Refills: 0 | Status: DISCONTINUED | OUTPATIENT
Start: 2025-04-20 | End: 2025-04-25

## 2025-04-20 RX ORDER — ENOXAPARIN SODIUM 100 MG/ML
40 INJECTION SUBCUTANEOUS EVERY 24 HOURS
Refills: 0 | Status: DISCONTINUED | OUTPATIENT
Start: 2025-04-20 | End: 2025-04-21

## 2025-04-20 RX ORDER — ROSUVASTATIN CALCIUM 20 MG/1
1 TABLET, FILM COATED ORAL
Refills: 0 | DISCHARGE

## 2025-04-20 RX ORDER — METOPROLOL SUCCINATE 50 MG/1
25 TABLET, EXTENDED RELEASE ORAL DAILY
Refills: 0 | Status: DISCONTINUED | OUTPATIENT
Start: 2025-04-20 | End: 2025-04-25

## 2025-04-20 RX ADMIN — ATORVASTATIN CALCIUM 80 MILLIGRAM(S): 80 TABLET, FILM COATED ORAL at 21:14

## 2025-04-20 RX ADMIN — METOPROLOL SUCCINATE 25 MILLIGRAM(S): 50 TABLET, EXTENDED RELEASE ORAL at 16:53

## 2025-04-20 NOTE — ED ADULT NURSE NOTE - TEMPLATE LIST FOR HEAD TO TOE ASSESSMENT
10/4 CT Head revealed small bilateral acute on chronic convexity subdural hematomas. Repeat CT Head on 10/5, 10/6 and 10/11 is stable with 10/16 results showing SDH decreasing in size. Repeat CT Head 10/23 with some small area of bleeding into previous collection.   Continue Keppra 500mg BID for seizure ppx    Neurosurgery following, no intervention at this time.   Repeat Head CT for worsening headaches on 11/3 and 11/15  c/w nearly resolved subdural hematomas  Palliative care consulted for pain management: started fentanyl patch Cardiac

## 2025-04-20 NOTE — ED ADULT TRIAGE NOTE - ACCOMPANIED BY
Onset: 9/8/2024    Location / description: uti symptoms. Urgency and burning, lower abdominal discomfort, pt reports taking phenazopyridine hydrochloride 95 mg that she already had at home. Patient states that she has test strips and tested positive for leukocytes.     Precipitating Factors: uti    Pain Scale (1 - 10), 10 highest: dull pain more discomfort    Associated Symptoms:     What improves/worsens symptoms:  phenazopyridine hydrochloride 95 mg and 1/2 teaspoon of baking soda    Symptom specific medications:     LMP : No LMP recorded. Patient is postmenopausal.    Are you pregnant or breast feeding: no    Recent Care: 7/15/2024 with dr. Izquierdo    Pt would like to know if it is ok for her to take OTC phenazopyridine hydrochloride 95 mg and 1/2 baking soda for the uti symptoms or does PCP want to send prescription to pharmacy.     If so,  Pharmacy confirmed:  [id number] - [place], [place] - [address] [facility] [address] [facility]  [address] [facility] [id number]           Self

## 2025-04-20 NOTE — H&P ADULT - HISTORY OF PRESENT ILLNESS
Pt is a 70 yo M, with PMHx of HLD, current smoker (2 cigarettes/day) presented to the ED for worsening chest pain. Yesterday, while walking on flat ground, he developed acute chest pain (described as pressure like, squeezing, associated with SOB) which resolved with rest. He denied any associated lightheadedness, dizziness, palpitations. Today, he was experiencing the same symptoms while going up and down a flight of stairs which prompted him to come to the ED. Symptoms always improved with rest, and never had these symptoms at rest. He has been having exertional chest pain for a while now but has been worsening recently. He has recently seen his cardiologist, Dr. Rodríguez and found to have an abnormal CCTA with concern of prox to mLAD severe stenosis; and FFR CT showing hemodynamically significant focal lesion in the mid LAD       He was started on ASA 81mg 3 weeks ago and stated that he has been having dark stools after starting ASA. Denies any BRBPR. Currently, he does not have any chest pain.     In the ED:  Vitals: · BP Systolic	149 mm Hg  · BP Diastolic	71 mm Hg  · Heart Rate	65 /min  · Respiration Rate (breaths/min)	20 /min  · Temp (F)	98 Degrees F  · Temp (C)	36.7 Degrees C  · Temp site	oral  · SpO2 (%)	99 %  · O2 Delivery/Oxygen Delivery Method	room air  · Temp at ED Arrival (C)	36.7 Degrees C    Labs notable for: Hgb 10.5 (down from 14.4 in March 2025), troponin 7   Pt is a 70 yo M, with PMHx of HLD, current smoker (2 cigarettes/day) presented to the ED for worsening chest pain. Yesterday, while walking on flat ground, he developed acute chest pain (described as pressure like, squeezing, associated with SOB) which resolved with rest. He denied any associated lightheadedness, dizziness, palpitations. Today, he was experiencing the same symptoms while going up and down a flight of stairs which prompted him to come to the ED. Symptoms always improved with rest, and never had these symptoms at rest. He has been having exertional chest pain for a while now but has been worsening recently. He has recently seen his cardiologist, Dr. Rodríguez and found to have an abnormal CCTA with concern of prox to mLAD severe stenosis; and FFR CT showing hemodynamically significant focal lesion in the mid LAD. Pt was scheduled for cath this week. Of note, he was started on ASA 81mg 3 weeks ago and stated that he has been having dark stools after starting ASA. Denies any BRBPR. Currently, he does not have any chest pain.     In the ED:  Vitals: · BP Systolic	149 mm Hg  · BP Diastolic	71 mm Hg  · Heart Rate	65 /min  · Respiration Rate (breaths/min)	20 /min  · Temp (F)	98 Degrees F  · Temp (C)	36.7 Degrees C  · Temp site	oral  · SpO2 (%)	99 %  · O2 Delivery/Oxygen Delivery Method	room air  · Temp at ED Arrival (C)	36.7 Degrees C    Labs notable for: Hgb 10.5 (down from 14.4 in March 2025), troponin 7  ECG: NSR, no ST changes    Prior imaging:  < from: CT Angio Heart and Coronaries w/ IV Cont (03.25.25 @ 09:30) >  IMPRESSION:    Concern proximal to mid LAD up to severe stenosis secondary to density   calcified plaque with limits luminal evaluation.    Distal RCA mixed calcified and noncalcified plaque causing subtotal   stenosis.      < end of copied text >  < from: CT Fractional Flow Reserve (FFR-CT) Non-Invasive w/ Interpretation & Report (03.31.25 @ 09:11) >  IMPRESSION:    Hemodynamically significant focal lesion in the mid LAD.      < end of copied text >     Pt is a 70 yo M, with PMHx of HLD, current smoker (2 cigarettes/day) presented to the ED for worsening chest pain. Yesterday, while walking on flat ground, he developed acute chest pain (described as pressure like, squeezing, associated with SOB) which resolved with rest. He denied any associated lightheadedness, dizziness, palpitations. Today, he was experiencing the same symptoms while going up and down a flight of stairs which prompted him to come to the ED. Symptoms always improved with rest, and never had these symptoms at rest. He has been having exertional chest pain for a while now but has been worsening recently. He has recently seen his cardiologist, Dr. Rodríguez and found to have an abnormal CCTA with concern of prox to mLAD severe stenosis; and FFR CT showing hemodynamically significant focal lesion in the mid LAD. Pt was scheduled for cath this week. Of note, he was started on ASA 81mg 3 weeks ago and stated that he has been having dark stools after starting ASA. Denies any BRBPR. Currently, he does not have any chest pain.     In the ED:  Vitals: · BP Systolic	149 mm Hg  · BP Diastolic	71 mm Hg  · Heart Rate	65 /min  · Respiration Rate (breaths/min)	20 /min  · Temp (F)	98 Degrees F  · Temp (C)	36.7 Degrees C  · Temp site	oral  · SpO2 (%)	99 %  · O2 Delivery/Oxygen Delivery Method	room air  · Temp at ED Arrival (C)	36.7 Degrees C    Labs notable for: Hgb 10.5 (down from 14.4 in March 2025), troponin 7  ECG: NSR, no ST changes    Prior imaging:  < from: CT Angio Heart and Coronaries w/ IV Cont (03.25.25 @ 09:30) >  IMPRESSION:    Concern proximal to mid LAD up to severe stenosis secondary to density   calcified plaque with limits luminal evaluation.    Distal RCA mixed calcified and noncalcified plaque causing subtotal   stenosis.      < end of copied text >  < from: CT Fractional Flow Reserve (FFR-CT) Non-Invasive w/ Interpretation & Report (03.31.25 @ 09:11) >  IMPRESSION:    Hemodynamically significant focal lesion in the mid LAD.      < end of copied text >      Evaluated by cardio and recommended admission to SDU for further monitoring, r/o ACS.

## 2025-04-20 NOTE — ED PROVIDER NOTE - ATTENDING APP SHARED VISIT CONTRIBUTION OF CARE
Pt presents with chest pain with exertion. Worsening with lesser exertion. Now with less than one flight of stairs. Assoc with shortness of breath. pt reports he did a CCTA coronaries and has LAD disease. No chest while resting. On exam S1S2 rrr, no murmur, lungs clear, abdomen is soft nontender, neuro intact.

## 2025-04-20 NOTE — H&P ADULT - NSHPREVIEWOFSYSTEMS_GEN_ALL_CORE
REVIEW OF SYSTEMS:    CONSTITUTIONAL: No weakness, fevers or chills  EYES/ENT: No visual changes;  No vertigo or throat pain   NECK: No pain or stiffness  RESPIRATORY: No cough, wheezing, hemoptysis; +exertional SOB  CARDIOVASCULAR: + exertional chest pain. no palpitations  GASTROINTESTINAL: No abdominal or epigastric pain. No nausea, vomiting, or hematemesis; No diarrhea or constipation. No melena or hematochezia.  GENITOURINARY: No dysuria, frequency or hematuria  NEUROLOGICAL: No numbness or weakness  SKIN: No itching, rashes

## 2025-04-20 NOTE — H&P ADULT - NSHPPHYSICALEXAM_GEN_ALL_CORE
T(C): 36.7 (04-20-25 @ 09:14), Max: 36.7 (04-20-25 @ 09:14)  HR: 74 (04-20-25 @ 12:32) (65 - 74)  BP: 123/68 (04-20-25 @ 12:32) (123/68 - 149/71)  RR: 20 (04-20-25 @ 12:32) (20 - 20)  SpO2: 99% (04-20-25 @ 12:32) (99% - 99%)    PHYSICAL EXAM:  GENERAL: patient appears well, no acute distress, appropriate, pleasant  EYES: sclera clear, no exudates  ENMT: oropharynx clear without erythema, no exudates, moist mucous membranes  NECK: supple, soft, no thyromegaly noted  LUNGS: good air entry bilaterally, clear to auscultation, symmetric breath sounds, no wheezing or rhonchi appreciated  HEART: soft S1/S2, regular rate and rhythm, no murmurs noted, no lower extremity edema  GASTROINTESTINAL: abdomen is soft, nontender, nondistended, normoactive bowel sounds, no palpable masses  INTEGUMENT: good skin turgor, no lesions noted  MUSCULOSKELETAL: no clubbing or cyanosis, no obvious deformity  NEUROLOGIC: awake, alert, oriented x3, good muscle tone in 4 extremities, no obvious sensory deficits  PSYCHIATRIC: mood is good, affect is congruent, linear and logical thought process  HEME/LYMPH: no palpable supraclavicular nodules, no obvious ecchymosis or petechiae

## 2025-04-20 NOTE — PATIENT PROFILE ADULT - FLU SEASON?
pt came in for feelings of depression after her mother took her phone and ps 5 away she denied ever trying to harm herself, or having a plan. she denied wanted to hurt someone else. denied drug/alcohol use
Yes...

## 2025-04-20 NOTE — H&P ADULT - ATTENDING COMMENTS
Pt seen w resident and agree w above.   Pt is a 70 yo male PMHx sig for HLD and "heart murmur".  + tobacco use 2 cigs/day and former 1ppd x ~18 years.   Pt w chest pain and recent ct chest to eval for CAD and found to have lad stenosis.  Pt scheduled for cardiac cath.  Presents to [facility] for exertional chest pain/pressure associated w sob.  Releived w rest, worsened over the past few days.  No acute EKG changes and initiall troponin is negative.  Pt seen by cardiology and recommended SDU admit, Check repeat CE.  Obtain TTE. Cardio f/u, will likely require cardiac cath. .  Pt recently started on asa and developed black stools.  At this time, will monitor hgb and obtain GI eval.

## 2025-04-20 NOTE — ED PROVIDER NOTE - OBJECTIVE STATEMENT
Patient c/o worsening intermittent chest pain/SOB  with exertion past several days, Had abnormal cardiac CTA, with possible LAD lesion, scheduled for cath this week  ,, states sx brought on with any activity and has trouble walking .

## 2025-04-20 NOTE — H&P ADULT - ASSESSMENT
Pt is a 70 yo M, with PMHx of HLD, current smoker (2 cigarettes/day) presented to the ED for worsening chest pain. Pt admitted to stepdown unit to for further monitoring, r/o ACS.     #exertional chest pain, r/o ACS  #HLD  - worsening exertional chest pain  - hemodynamically stable  - ECG NSR. no acute ischemic changes  - CXR negative  - abnormal CCTA 3/25/25 as above- significant LAD stenosis  - troponin negative x2  - cardio following    Plan:  - hold off on ASA for now, pending GI consult  - check TTE   - GI consult regarding melena   - monitor hemoglobin  - on home rosuvastatin 20mg, continue with atorvastatin 80mg here  - start toprol 25mg qdaily  - monitor BP  -     - Recommend GI evaluation to further evaluate and to see when he would be cleared for dapt  - Check repeat CBC   - APT on hold given the above   - Can start BB and statin   - Check TTE   Pt is a 70 yo M, with PMHx of HLD, current smoker (2 cigarettes/day) presented to the ED for worsening chest pain. Pt admitted to stepdown unit to for further monitoring, r/o ACS.     #exertional chest pain, r/o ACS  #HLD  - worsening exertional chest pain  - hemodynamically stable  - ECG NSR. no acute ischemic changes  - CXR negative  - abnormal CCTA 3/25/25 as above- significant LAD stenosis  - troponin negative x2  - cardio following    Plan:  - hold off on ASA for now, pending GI consult  - check TTE   - GI consult regarding melena   - monitor hemoglobin  - monitor BP  - check lipid profile, a1c  - check TTE   - on home rosuvastatin 20mg, continue with atorvastatin 80mg here  - start metoprolol succinate 25mg qdaily  - DASH diet  - keep NPO after midnight in case of any cath      #dark stools/possible melena  - GI consult as above  - monitor CBC  - check iron studies    #Misc:  - DVT ppx: lovenox  - GI ppx: protonix  - Diet: DASH  - Activity: IAT  - Dispo: SDU   Pt is a 70 yo M, with PMHx of HLD, current smoker (2 cigarettes/day) presented to the ED for worsening chest pain. Pt admitted to stepdown unit to for further monitoring, r/o ACS.     #exertional chest pain, r/o ACS  #HLD  - worsening exertional chest pain  - hemodynamically stable  - ECG NSR. no acute ischemic changes  - CXR negative  - abnormal CCTA 3/25/25 as above- significant LAD stenosis  - troponin negative x2  - cardio following    Plan:  - hold off on ASA for now, pending GI consult  - check TTE   - GI consult regarding melena   - monitor hemoglobin  - monitor BP  - check lipid profile, a1c  - check TTE   - on home rosuvastatin 20mg, continue with atorvastatin 80mg here  - start metoprolol succinate 25mg qdaily  - DASH diet  - keep NPO after midnight in case of any cath    #anemia  #dark stools/possible melena  - hgb 14 in March 2025, now 10 on admission  - GI consult as above  - monitor CBC  - check iron studies    #Misc:  - DVT ppx: lovenox  - GI ppx: protonix  - Diet: DASH  - Activity: IAT  - Dispo: SDU   Pt is a 70 yo M, with PMHx of HLD, current smoker (2 cigarettes/day) presented to the ED for worsening chest pain. Pt admitted to stepdown unit to for further monitoring, r/o ACS.     #exertional chest pain, r/o ACS  #HLD  - worsening exertional chest pain  - hemodynamically stable  - ECG NSR. no acute ischemic changes  - CXR negative  - abnormal CCTA 3/25/25 as above- significant LAD stenosis  - troponin negative x2  - cardio following    Plan:  - hold off on ASA for now, pending GI consult  - check TTE   - GI consult regarding melena   - monitor hemoglobin  - monitor BP  - check lipid profile, a1c  - check TTE   - on home rosuvastatin 20mg, continue with atorvastatin 80mg here  - start metoprolol succinate 25mg qdaily  - DASH diet  - keep NPO after midnight in case of any cath    #anemia  #dark stools/possible melena  - hgb 14 in March 2025, now 10 on admission  - GI consult as above  - monitor CBC  - check iron studies  - hemodynamically stable, no current signs of active bleed    #Misc:  - DVT ppx: lovenox  - GI ppx: protonix  - Diet: DASH  - Activity: IAT  - Dispo: SDU

## 2025-04-21 ENCOUNTER — RESULT REVIEW (OUTPATIENT)
Age: 70
End: 2025-04-21

## 2025-04-21 LAB
A1C WITH ESTIMATED AVERAGE GLUCOSE RESULT: 4.6 % — SIGNIFICANT CHANGE UP (ref 4–5.6)
ALBUMIN SERPL ELPH-MCNC: 3.9 G/DL — SIGNIFICANT CHANGE UP (ref 3.5–5.2)
ALP SERPL-CCNC: 111 U/L — SIGNIFICANT CHANGE UP (ref 30–115)
ALT FLD-CCNC: 16 U/L — SIGNIFICANT CHANGE UP (ref 0–41)
ANION GAP SERPL CALC-SCNC: 7 MMOL/L — SIGNIFICANT CHANGE UP (ref 7–14)
AST SERPL-CCNC: 18 U/L — SIGNIFICANT CHANGE UP (ref 0–41)
BASOPHILS # BLD AUTO: 0.05 K/UL — SIGNIFICANT CHANGE UP (ref 0–0.2)
BASOPHILS NFR BLD AUTO: 1.3 % — HIGH (ref 0–1)
BILIRUB SERPL-MCNC: 0.8 MG/DL — SIGNIFICANT CHANGE UP (ref 0.2–1.2)
BLD GP AB SCN SERPL QL: SIGNIFICANT CHANGE UP
BUN SERPL-MCNC: 15 MG/DL — SIGNIFICANT CHANGE UP (ref 10–20)
CALCIUM SERPL-MCNC: 9 MG/DL — SIGNIFICANT CHANGE UP (ref 8.4–10.5)
CHLORIDE SERPL-SCNC: 107 MMOL/L — SIGNIFICANT CHANGE UP (ref 98–110)
CHOLEST SERPL-MCNC: 128 MG/DL — SIGNIFICANT CHANGE UP
CO2 SERPL-SCNC: 26 MMOL/L — SIGNIFICANT CHANGE UP (ref 17–32)
CREAT SERPL-MCNC: 1.1 MG/DL — SIGNIFICANT CHANGE UP (ref 0.7–1.5)
EGFR: 72 ML/MIN/1.73M2 — SIGNIFICANT CHANGE UP
EGFR: 72 ML/MIN/1.73M2 — SIGNIFICANT CHANGE UP
EOSINOPHIL # BLD AUTO: 0.16 K/UL — SIGNIFICANT CHANGE UP (ref 0–0.7)
EOSINOPHIL NFR BLD AUTO: 4.2 % — SIGNIFICANT CHANGE UP (ref 0–8)
ESTIMATED AVERAGE GLUCOSE: 85 MG/DL — SIGNIFICANT CHANGE UP (ref 68–114)
FERRITIN SERPL-MCNC: 17 NG/ML — LOW (ref 30–400)
GLUCOSE SERPL-MCNC: 86 MG/DL — SIGNIFICANT CHANGE UP (ref 70–99)
HCT VFR BLD CALC: 29.9 % — LOW (ref 42–52)
HDLC SERPL-MCNC: 71 MG/DL — SIGNIFICANT CHANGE UP
HGB BLD-MCNC: 9.9 G/DL — LOW (ref 14–18)
IMM GRANULOCYTES NFR BLD AUTO: 0.3 % — SIGNIFICANT CHANGE UP (ref 0.1–0.3)
LDLC SERPL-MCNC: 44 MG/DL — SIGNIFICANT CHANGE UP
LIPID PNL WITH DIRECT LDL SERPL: 44 MG/DL — SIGNIFICANT CHANGE UP
LYMPHOCYTES # BLD AUTO: 0.73 K/UL — LOW (ref 1.2–3.4)
LYMPHOCYTES # BLD AUTO: 19.2 % — LOW (ref 20.5–51.1)
MAGNESIUM SERPL-MCNC: 2.1 MG/DL — SIGNIFICANT CHANGE UP (ref 1.8–2.4)
MCHC RBC-ENTMCNC: 29.7 PG — SIGNIFICANT CHANGE UP (ref 27–31)
MCHC RBC-ENTMCNC: 33.1 G/DL — SIGNIFICANT CHANGE UP (ref 32–37)
MCV RBC AUTO: 89.8 FL — SIGNIFICANT CHANGE UP (ref 80–94)
MONOCYTES # BLD AUTO: 0.32 K/UL — SIGNIFICANT CHANGE UP (ref 0.1–0.6)
MONOCYTES NFR BLD AUTO: 8.4 % — SIGNIFICANT CHANGE UP (ref 1.7–9.3)
NEUTROPHILS # BLD AUTO: 2.53 K/UL — SIGNIFICANT CHANGE UP (ref 1.4–6.5)
NEUTROPHILS NFR BLD AUTO: 66.6 % — SIGNIFICANT CHANGE UP (ref 42.2–75.2)
NONHDLC SERPL-MCNC: 57 MG/DL — SIGNIFICANT CHANGE UP
NRBC BLD AUTO-RTO: 0 /100 WBCS — SIGNIFICANT CHANGE UP (ref 0–0)
PLATELET # BLD AUTO: 155 K/UL — SIGNIFICANT CHANGE UP (ref 130–400)
PMV BLD: 9.9 FL — SIGNIFICANT CHANGE UP (ref 7.4–10.4)
POTASSIUM SERPL-MCNC: 4.6 MMOL/L — SIGNIFICANT CHANGE UP (ref 3.5–5)
POTASSIUM SERPL-SCNC: 4.6 MMOL/L — SIGNIFICANT CHANGE UP (ref 3.5–5)
PROT SERPL-MCNC: 5.8 G/DL — LOW (ref 6–8)
RBC # BLD: 3.33 M/UL — LOW (ref 4.7–6.1)
RBC # FLD: 13.5 % — SIGNIFICANT CHANGE UP (ref 11.5–14.5)
SODIUM SERPL-SCNC: 140 MMOL/L — SIGNIFICANT CHANGE UP (ref 135–146)
TRIGL SERPL-MCNC: 57 MG/DL — SIGNIFICANT CHANGE UP
TROPONIN T, HIGH SENSITIVITY RESULT: 6 NG/L — SIGNIFICANT CHANGE UP (ref 6–21)
WBC # BLD: 3.8 K/UL — LOW (ref 4.8–10.8)
WBC # FLD AUTO: 3.8 K/UL — LOW (ref 4.8–10.8)

## 2025-04-21 PROCEDURE — 99223 1ST HOSP IP/OBS HIGH 75: CPT

## 2025-04-21 PROCEDURE — 93010 ELECTROCARDIOGRAM REPORT: CPT

## 2025-04-21 PROCEDURE — 93306 TTE W/DOPPLER COMPLETE: CPT | Mod: 26

## 2025-04-21 PROCEDURE — 99233 SBSQ HOSP IP/OBS HIGH 50: CPT

## 2025-04-21 RX ORDER — POLYETHYLENE GLYCOL-3350 AND ELECTROLYTES 236; 6.74; 5.86; 2.97; 22.74 G/274.31G; G/274.31G; G/274.31G; G/274.31G; G/274.31G
4000 POWDER, FOR SOLUTION ORAL ONCE
Refills: 0 | Status: COMPLETED | OUTPATIENT
Start: 2025-04-21 | End: 2025-04-21

## 2025-04-21 RX ADMIN — POLYETHYLENE GLYCOL-3350 AND ELECTROLYTES 4000 MILLILITER(S): 236; 6.74; 5.86; 2.97; 22.74 POWDER, FOR SOLUTION ORAL at 15:36

## 2025-04-21 RX ADMIN — METOPROLOL SUCCINATE 25 MILLIGRAM(S): 50 TABLET, EXTENDED RELEASE ORAL at 05:36

## 2025-04-21 RX ADMIN — ATORVASTATIN CALCIUM 80 MILLIGRAM(S): 80 TABLET, FILM COATED ORAL at 21:07

## 2025-04-21 RX ADMIN — Medication 1 APPLICATION(S): at 13:50

## 2025-04-21 RX ADMIN — Medication 40 MILLIGRAM(S): at 05:35

## 2025-04-21 NOTE — PROGRESS NOTE ADULT - ASSESSMENT
Pt is a 68 yo M, with PMHx of HLD, current smoker (2 cigarettes/day) presented to the ED for worsening chest pain. Pt admitted to stepdown unit to for further monitoring, r/o ACS.     #chest pain on exertion  #HLD  - statin  - BB  - advisable to do endoscopic screening now before doing LHC since forseeable PCI will make it difficult to do any procedures that require coming off DAPT for several months    #anemia  #dark stools/possible melena  - GI eval

## 2025-04-21 NOTE — PROGRESS NOTE ADULT - SUBJECTIVE AND OBJECTIVE BOX
Pt denies CP  feels fine when laying in bed    T(F): , Max: 97 (04-21-25 @ 23:00)  HR: 74 (04-21-25 @ 21:10) (59 - 74)  BP: 118/59 (04-21-25 @ 21:10)  RR: 18 (04-21-25 @ 23:00)  SpO2: 100% (04-21-25 @ 21:10)  IN: 240 mL / OUT: 575 mL / NET: -335 mL      General: No apparent distress  Cardiovascular: S1, S2  Gastrointestinal: Soft, Non-tender, Non-distended  Respiratory: Good air entry bilaterally  Musculoskeletal: Moves all extremities  Lymphatic: No edema  Neurologic: No gross motor deficit  Dermatologic: Skin dry  PT/INR - ( 20 Apr 2025 09:40 )   PT: 10.80 sec;   INR: 0.92 ratio         PTT - ( 20 Apr 2025 09:40 )  PTT:35.5 sec                        9.9    3.80  )-----------( 155      ( 21 Apr 2025 06:01 )             29.9     04-21    140  |  107  |  15  ----------------------------<  86  4.6   |  26  |  1.1    Ca    9.0      21 Apr 2025 06:01  Mg     2.1     04-21    TPro  5.8[L]  /  Alb  3.9  /  TBili  0.8  /  DBili  x   /  AST  18  /  ALT  16  /  AlkPhos  111  04-21

## 2025-04-21 NOTE — CONSULT NOTE ADULT - SUBJECTIVE AND OBJECTIVE BOX
HPI:  Patient is 70 yo M with Hx of HLD, c/o worsening intermittent chest pain/SOB  with exertion past several days, Had abnormal cardiac CTA, with possible LAD lesion, scheduled for cath this week  ,, states sx brought on with any activity and has trouble walking 4/20/25).      HPI-Cardiology   Pt with the above Hx and HPI, evaluated at bedside. Pt presented for eval of worsening, burning chest pain and SOB on exertion the day before. Pt is scheduled for [facility] this week for abnormal CCTA as outpatient. Pt states that he has been having these symptoms for a while, but it worse the day before and that prompted him to come to the hospital. Currently denies any CP, or COB at rest of exertion and denies any other cardiac related symptoms. Radiology tests and hospital records, were reviewed, as well as previous notes on this patient.      PAST MEDICAL & SURGICAL HISTORY  High cholesterol          ALLERGIES:  No Known Allergies      MEDICATIONS:  MEDICATIONS  (STANDING):    MEDICATIONS  (PRN):      HOME MEDICATIONS:  Home Medications:      VITALS:   T(F): 98 (04-20 @ 09:14), Max: 98 (04-20 @ 09:14)  HR: 65 (04-20 @ 09:14) (65 - 65)  BP: 149/71 (04-20 @ 09:14) (149/71 - 149/71)  BP(mean): --  RR: 20 (04-20 @ 09:14) (20 - 20)  SpO2: 99% (04-20 @ 09:14) (99% - 99%)          REVIEW OF SYSTEMS:  See HPI      PHYSICAL EXAM:  NEURO: patient is awake , alert and oriented  GEN: Not in acute distress  NECK: no thyroid enlargement, no JVD  LUNGS: Clear to auscultation bilaterally   CARDIOVASCULAR: S1/S2 present, RRR , no murmurs or rubs, no carotid bruits,  + PP bilaterally  ABD: Soft, non-tender, non-distended, +BS  EXT: No SHEREE  SKIN: Intact      LABS:                        10.5   3.86  )-----------( 172      ( 20 Apr 2025 09:40 )             30.8     04-20    136  |  102  |  14  ----------------------------<  65[L]  3.9   |  26  |  1.0    Ca    9.3      20 Apr 2025 09:40  Mg     2.2     04-20    TPro  6.7  /  Alb  4.5  /  TBili  1.1  /  DBili  x   /  AST  22  /  ALT  20  /  AlkPhos  130[H]  04-20    PT/INR - ( 20 Apr 2025 09:40 )   PT: 10.80 sec;   INR: 0.92 ratio         PTT - ( 20 Apr 2025 09:40 )  PTT:35.5 sec          RADIOLOGY:  -CXR:  < from: Xray Chest 1 View-PORTABLE IMMEDIATE (04.20.25 @ 09:26) >    IMPRESSION:    No radiographic evidence of acute cardiopulmonary disease.    --- End of Report ---    < end of copied text >                      -CCTA:  < from: CT Angio Heart and Coronaries w/ IV Cont (03.25.25 @ 09:30) >  IMPRESSION:    Concern proximal to mid LAD up to severe stenosis secondary to density   calcified plaque with limits luminal evaluation.    Distal RCA mixed calcified and noncalcified plaque causing subtotal   stenosis.  Remaining nonobstructive disease as above.    CAD-RADS 4A.    The total Agatston coronary artery calcium score equals 1128, which   corresponds to 85th percentile for age, gender and ethnicity.    6 mm solid nodule in the left upper lobe.    Adjacent ill-defined cystic lesion measuring approximately 1.3 cm in long   axis with peripheral thickness. Neoplastic process cannot be excluded in   this time. Follow-up CT in 3-6 months is recommended.    The study will be sent for analysis via FFR and a separate report will be   created to reflect these findings when available.      CAD-RADS  Reporting and data system  ______________________________________________________________________    CAD-RADS 0  -    No plaque or stenosis                 Documented absence   of CAD  CAD-RADS 1  -    1-24% Minimal stenosis              Minimal   non-obstructive CAD  CAD-RADS 2  -    25 - 49% Mild stenosis                Mild   non-obstructive CAD  CAD-RADS 3  -    50 - 69% stenosis                        Moderate   Stenosis  CAD-RADS 4  -    A. 70 - 99%Stenosis  or             Severe Stenosis                                  B. Left main >50%  CAD-RADS 5  -    100% (total occlusion)                 Total coronary   occlusion  CAD-RADS N  -    Non-diagnostic study                   Obstructive CAD   cannot be excluded    --- End of Report ---    < end of copied text >          ECG:  < from: 12 Lead ECG (04.20.25 @ 09:11) >  Normal sinus rhythm  Left anterior fascicular block  Minimal voltage criteria for LVH, may be normal variant  Abnormal ECG    Confirmed by MD ZELALEM, CARYN ([id number]) on 4/20/2025 10:21:01 AM    < end of copied text >          Assessment and Recommendations:   70 yo M with Hx of HLD, presented for worsening CP and SOB on exertion    Impression:  #Chest pain: r/o ACS  #HLD    Pt currently asymptomatic and HD stable  ECG: Normal sinus rhythm, Left anterior fascicular block, no acute ischemic changes  Trop x1 is negative/flat  Pt endorses "black stools" after starting ASA  CCTA: 3/25/25: Concern proximal to mid LAD up to severe stenosis secondary to density calcified plaque with limits luminal evaluation. Distal RCA mixed calcified and noncalcified plaque causing subtotal stenosis. Remaining nonobstructive disease as above. CAD-RADS 4A.  Pt was scheduled for [facility] this upcoming week        Plan:  Obtain TTE  Check another trop  Hold ASA for now  Recommend GI input, given Pt c/o melena, and if Pt would be able to tolerate SAPT/DAPT post LHC/long-term  Monitor Hgb and transfuse PRN  Would start MTP 25mg PO daily, if BP permits  Would start on a statin if no contraindications   
Chief complaint/Reason for consult: intermittent melena     HPI:  Pt is a 68 yo M, with PMHx of HLD, current smoker (2 cigarettes/day) presented to the ED for worsening chest pain. Yesterday, while walking on flat ground, he developed acute chest pain (described as pressure like, squeezing, associated with SOB) which resolved with rest. He denied any associated lightheadedness, dizziness, palpitations. Today, he was experiencing the same symptoms while going up and down a flight of stairs which prompted him to come to the ED. Symptoms always improved with rest, and never had these symptoms at rest. He has been having exertional chest pain for a while now but has been worsening recently. He has recently seen his cardiologist, Dr. Rodríguez and found to have an abnormal CCTA with concern of prox to mLAD severe stenosis; and FFR CT showing hemodynamically significant focal lesion in the mid LAD. Pt was scheduled for cath this week. Of note, he was started on ASA 81mg 3 weeks ago and stated that he has been having dark stools after starting ASA. Denies any BRBPR. Currently, he does not have any chest pain.       GI updates: 69yMale pmh HLD, smoker presents for chest pain, patient started ASA 3/27 and reports intermittent melenic stools since. Patient has never had a GI workup before. Currently Patient denies nausea, vomiting, hematemesis, melena, blood in stool, diarrhea, constipation, abdominal pain. Patient denies N-said use.      PAST MEDICAL & SURGICAL HISTORY:   High cholesterol            Family history:  FAMILY HISTORY:    No GI cancers in first or second degree relatives    Social History: +two cigarettes daily smoking. No alcohol. No illegal drug use.    Allergies:  No Known Allergies  Intolerances        MEDICATIONS: Home Medications:  aspirin 81 mg oral tablet: 1 tab(s) orally once a day (20 Apr 2025 13:09)  rosuvastatin 20 mg oral tablet: 1 tab(s) orally once a day (20 Apr 2025 13:09)    MEDICATIONS  (STANDING):  atorvastatin 80 milliGRAM(s) Oral at bedtime  chlorhexidine 2% Cloths 1 Application(s) Topical daily  enoxaparin Injectable 40 milliGRAM(s) SubCutaneous every 24 hours  influenza  Vaccine (HIGH DOSE) 0.5 milliLiter(s) IntraMuscular once  metoprolol succinate ER 25 milliGRAM(s) Oral daily  pantoprazole    Tablet 40 milliGRAM(s) Oral before breakfast  polyethylene glycol/electrolyte Solution. 4000 milliLiter(s) Oral once            REVIEW OF SYSTEMS  General:  No weight loss, fevers, or chills.  Eyes:  No reported pain or visual changes  ENT:  No sore throat or runny nose.  NECK: No stiffness   CV:  No chest pain or palpitations.  Resp:  No shortness of breath, cough, wheezing or hemoptysis  GI:  No abdominal pain, nausea, vomiting, dysphagia, diarrhea or constipation. No rectal bleeding, +intermittent melena, no  hematemesis.  Neuro:  No tingling, numbness       VITALS:   T(F): 96.9 (04-21-25 @ 07:00), Max: 97.7 (04-20-25 @ 23:00)  HR: 72 (04-21-25 @ 07:29) (66 - 78)  BP: 117/62 (04-21-25 @ 07:29) (99/50 - 125/60)  RR: 22 (04-21-25 @ 07:29) (19 - 27)  SpO2: 100% (04-21-25 @ 07:29) (97% - 100%)    PHYSICAL EXAM:  GENERAL: AAOx3, no acute distress.  HEAD:  Atraumatic, Normocephalic  EYES: conjunctiva and sclera clear  NECK: Supple, No thyromegaly   CHEST/LUNG: Clear to auscultation bilaterally; No wheeze, rhonchi, or rales  HEART: Regular rate and rhythm; normal S1, S2, No murmurs.  ABDOMEN: Soft, nontender, nondistended; Bowel sounds present  NEUROLOGY: No asterixis or tremor  SKIN: Intact, no jaundice  Rectal exam-brown stool in rectal vault and on finger        LABS:  04-21    140  |  107  |  15  ----------------------------<  86  4.6   |  26  |  1.1    Ca    9.0      21 Apr 2025 06:01  Mg     2.1     04-21    TPro  5.8[L]  /  Alb  3.9  /  TBili  0.8  /  DBili  x   /  AST  18  /  ALT  16  /  AlkPhos  111  04-21                          9.9    3.80  )-----------( 155      ( 21 Apr 2025 06:01 )             29.9     LIVER FUNCTIONS - ( 21 Apr 2025 06:01 )  Alb: 3.9 g/dL / Pro: 5.8 g/dL / ALK PHOS: 111 U/L / ALT: 16 U/L / AST: 18 U/L / GGT: x           PT/INR - ( 20 Apr 2025 09:40 )   PT: 10.80 sec;   INR: 0.92 ratio         PTT - ( 20 Apr 2025 09:40 )  PTT:35.5 sec    IMAGING:    < from: Xray Chest 1 View-PORTABLE IMMEDIATE (04.20.25 @ 09:26) >    ACC: [id number] EXAM:  XR CHEST PORTABLE IMMED 1V   ORDERED BY: DANILO MONDRAGON     PROCEDURE DATE:  04/20/2025          INTERPRETATION:  CLINICAL HISTORY: Pain.    COMPARISON: None.    TECHNIQUE: Portable frontal chest radiograph.    FINDINGS:    Support devices: None.    Cardiac/mediastinum/hilum: Unremarkable    Lung parenchyma/Pleura: No focal parenchymal opacities, pleural   effusions, or pneumothorax.    Skeleton/soft tissues: Unremarkable.      IMPRESSION:    No radiographic evidence of acute cardiopulmonary disease.    --- End of Report ---            ENOCH GIL MD; Attending Radiologist  This document has been electronically signed. Apr 20 2025  9:36AM    < end of copied text >

## 2025-04-21 NOTE — PROGRESS NOTE ADULT - SUBJECTIVE AND OBJECTIVE BOX
DATE OF SERVICE: 04-21-25    Patient denies chest pain or shortness of breath.   Review of symptoms otherwise negative.    atorvastatin 80 milliGRAM(s) Oral at bedtime  chlorhexidine 2% Cloths 1 Application(s) Topical daily  enoxaparin Injectable 40 milliGRAM(s) SubCutaneous every 24 hours  influenza  Vaccine (HIGH DOSE) 0.5 milliLiter(s) IntraMuscular once  metoprolol succinate ER 25 milliGRAM(s) Oral daily  pantoprazole    Tablet 40 milliGRAM(s) Oral before breakfast                            9.9    3.80  )-----------( 155      ( 21 Apr 2025 06:01 )             29.9       Hemoglobin: 9.9 g/dL (04-21 @ 06:01)  Hemoglobin: 9.4 g/dL (04-20 @ 19:03)  Hemoglobin: 10.5 g/dL (04-20 @ 09:40)      04-21    140  |  107  |  15  ----------------------------<  86  4.6   |  26  |  1.1    Ca    9.0      21 Apr 2025 06:01  Mg     2.1     04-21    TPro  5.8[L]  /  Alb  3.9  /  TBili  0.8  /  DBili  x   /  AST  18  /  ALT  16  /  AlkPhos  111  04-21    Creatinine Trend: 1.1<--, 1.0<--    COAGS:           T(C): 36.1 (04-21-25 @ 07:00), Max: 36.5 (04-20-25 @ 23:00)  HR: 72 (04-21-25 @ 07:29) (66 - 78)  BP: 117/62 (04-21-25 @ 07:29) (99/50 - 125/60)  RR: 22 (04-21-25 @ 07:29) (18 - 27)  SpO2: 100% (04-21-25 @ 07:29) (97% - 100%)    GENERAL:  68y/o Male NAD, resting comfortably.  HEAD:  Atraumatic, Normocephalic  EYES: EOMI, PERRLA, conjunctiva and sclera clear  NECK: Supple, No JVD, no cervical lymphadenopathy, non-tender  CHEST/LUNG: Clear to auscultation bilaterally; No wheeze, rhonchi, or rales  HEART: Regular rate and rhythm; S1&S2  ABDOMEN: Soft, Nontender, Nondistended x 4 quadrants; Bowel sounds present  EXTREMITIES:   Peripheral Pulses Present, No clubbing, no cyanosis, or no edema, no calf tenderness  PSYCH: AAOx3, cooperative, appropriate  NEUROLOGY: WNL  SKIN: WNLWt(kg): --    I&O's Summary    20 Apr 2025 07:01  -  21 Apr 2025 07:00  --------------------------------------------------------  IN: 240 mL / OUT: 575 mL / NET: -335 mL    < from: TTE Echo Complete w/o Contrast w/ Doppler (04.21.25 @ 11:53) >    Summary:   1. Normal global left ventricular systolic function.   2. LV Ejection Fraction by Otero's Method with a biplane EF of 63 %.   3. Mild concentric left ventricular hypertrophy.   4. Spectral Doppler shows impaired relaxation pattern of left   ventricular myocardial filling (Grade I diastolic dysfunction).   5. Normalright ventricular size and function.   6. Normal left atrial size.   7. Normal right atrial size.   8. Severe aortic valve stenosis.   9. PV 3.99 m/s, PG 64 mmHg, MG 35 mmHg, JOEL by continuity 0.9 cm2, AVAi   0.49 cm2/m2, DI 0.23.  10. Mild aortic regurgitation.  11. Trace mitral valve regurgitation.  12. Normal pulmonary artery pressure.  13. There is no evidence of pericardial effusion.      < end of copied text >  < from: 12 Lead ECG (04.21.25 @ 07:52) >    Diagnosis Line Sinus bradycardia  Left anterior fascicular block  Minimal voltage criteria for LVH, may be normal variant ( [employer] product )  Abnormal ECG    Confirmed by Lopez Rodríguez ([id number]) on 4/21/2025 10:14:35 AM    < end of copied text >      Assessment and Recommendations:   70 yo M with Hx of HLD, presented for worsening CP and SOB on exertion    Impression:  #Chest pain: r/o ACS  #HLD    - Pt currently asymptomatic and HD stable  - ECG: Normal sinus rhythm, Left anterior fascicular block, no acute ischemic changes  - Trop x2 negative/flat  - CCTA: 3/25/25: Pt. with recent CCTA demonstrating significant stenosis in LAD   - Would ideally perform LHC with possible PCI however the patient has anemia and reports black stools for the last two weeks.   - Recommend GI evaluation to further evaluate and to see when he would be cleared for dapt  - Check repeat CBC   - APT on hold given the above   - Can start BB and statin     #This consult serves only as a perioperative cardiac risk stratification and evaluation to predict 30-day cardiac complications risk and mortality.  The decision to proceed with the surgery/procedure is made by the performing physician and the patient.     - **TTE: Demonstrates severe AS  - Able to walk >4 METS without any anginal symptoms   - Elevated-risk for perioperative major adverse cardiac events for low risk procedure  - RCRI: 1  - No further cardiac workup is indicated at this time. Further cardiac workup will depend on clinical course  - All other workup per primary team          *** This is a preliminary note, attending attestation to follow****     DATE OF SERVICE: 04-21-25    Cardio Progress Note  Risk Stratification for GI Intervention    Patient denies chest pain or shortness of breath.   Review of symptoms otherwise negative.    atorvastatin 80 milliGRAM(s) Oral at bedtime  chlorhexidine 2% Cloths 1 Application(s) Topical daily  enoxaparin Injectable 40 milliGRAM(s) SubCutaneous every 24 hours  influenza  Vaccine (HIGH DOSE) 0.5 milliLiter(s) IntraMuscular once  metoprolol succinate ER 25 milliGRAM(s) Oral daily  pantoprazole    Tablet 40 milliGRAM(s) Oral before breakfast                            9.9    3.80  )-----------( 155      ( 21 Apr 2025 06:01 )             29.9       Hemoglobin: 9.9 g/dL (04-21 @ 06:01)  Hemoglobin: 9.4 g/dL (04-20 @ 19:03)  Hemoglobin: 10.5 g/dL (04-20 @ 09:40)      04-21    140  |  107  |  15  ----------------------------<  86  4.6   |  26  |  1.1    Ca    9.0      21 Apr 2025 06:01  Mg     2.1     04-21    TPro  5.8[L]  /  Alb  3.9  /  TBili  0.8  /  DBili  x   /  AST  18  /  ALT  16  /  AlkPhos  111  04-21    Creatinine Trend: 1.1<--, 1.0<--    COAGS:           T(C): 36.1 (04-21-25 @ 07:00), Max: 36.5 (04-20-25 @ 23:00)  HR: 72 (04-21-25 @ 07:29) (66 - 78)  BP: 117/62 (04-21-25 @ 07:29) (99/50 - 125/60)  RR: 22 (04-21-25 @ 07:29) (18 - 27)  SpO2: 100% (04-21-25 @ 07:29) (97% - 100%)    GENERAL:  68y/o Male NAD, resting comfortably.  HEAD:  Atraumatic, Normocephalic  EYES: EOMI, PERRLA, conjunctiva and sclera clear  NECK: Supple, No JVD, no cervical lymphadenopathy, non-tender  CHEST/LUNG: Clear to auscultation bilaterally; No wheeze, rhonchi, or rales  HEART: Regular rate and rhythm; S1&S2  ABDOMEN: Soft, Nontender, Nondistended x 4 quadrants; Bowel sounds present  EXTREMITIES:   Peripheral Pulses Present, No clubbing, no cyanosis, or no edema, no calf tenderness  PSYCH: AAOx3, cooperative, appropriate  NEUROLOGY: WNL  SKIN: WNLWt(kg): --    I&O's Summary    20 Apr 2025 07:01  -  21 Apr 2025 07:00  --------------------------------------------------------  IN: 240 mL / OUT: 575 mL / NET: -335 mL    < from: TTE Echo Complete w/o Contrast w/ Doppler (04.21.25 @ 11:53) >    Summary:   1. Normal global left ventricular systolic function.   2. LV Ejection Fraction by Otero's Method with a biplane EF of 63 %.   3. Mild concentric left ventricular hypertrophy.   4. Spectral Doppler shows impaired relaxation pattern of left   ventricular myocardial filling (Grade I diastolic dysfunction).   5. Normalright ventricular size and function.   6. Normal left atrial size.   7. Normal right atrial size.   8. Severe aortic valve stenosis.   9. PV 3.99 m/s, PG 64 mmHg, MG 35 mmHg, JOEL by continuity 0.9 cm2, AVAi   0.49 cm2/m2, DI 0.23.  10. Mild aortic regurgitation.  11. Trace mitral valve regurgitation.  12. Normal pulmonary artery pressure.  13. There is no evidence of pericardial effusion.      < end of copied text >  < from: 12 Lead ECG (04.21.25 @ 07:52) >    Diagnosis Line Sinus bradycardia  Left anterior fascicular block  Minimal voltage criteria for LVH, may be normal variant ( [place] product )  Abnormal ECG    Confirmed by Lopez Rodríguez ([id number]) on 4/21/2025 10:14:35 AM    < end of copied text >      Assessment and Recommendations:   70 yo M with Hx of HLD, presented for worsening CP and SOB on exertion    Impression:  #Chest pain: r/o ACS  #HLD    - Pt currently asymptomatic and HD stable  - ECG: Normal sinus rhythm, Left anterior fascicular block, no acute ischemic changes  - Trop x2 negative/flat  - CCTA: 3/25/25: Pt. with recent CCTA demonstrating significant stenosis in LAD   - Would ideally perform LHC with possible PCI however the patient has anemia and reports black stools for the last two weeks.   - Recommend GI evaluation to further evaluate and to see when he would be cleared for dapt  - Check repeat CBC   - APT on hold given the above   - Can start BB and statin     #This consult serves only as a perioperative cardiac risk stratification and evaluation to predict 30-day cardiac complications risk and mortality.  The decision to proceed with the surgery/procedure is made by the performing physician and the patient.     - **TTE: Demonstrates severe AS  - Able to walk >4 METS without any anginal symptoms   - Elevated-risk for perioperative major adverse cardiac events for low risk procedure  - RCRI: 1  - No further cardiac workup is indicated at this time. Further cardiac workup will depend on clinical course  - All other workup per primary team          *** This is a preliminary note, attending attestation to follow****

## 2025-04-21 NOTE — CONSULT NOTE ADULT - NS ATTEND AMEND GEN_ALL_CORE FT
Patient seen and examined.  Agree with above.   - In brief, 69 year old male with history of HLD and CAD admitted with burning chest pain and dyspnea on exertion.   - Pt. with recent CCTA demonstrating significant stenosis in LAD   - Would ideally perform LHC with possible PCI however the patient has anemia and reports black stools for the last two weeks.   - Recommend GI evaluation to further evaluate and to see when he would be cleared for dapt  - Check repeat CBC   - APT on hold given the above   - Can start BB and statin   - Check TTE  - Further workup pending above.     Westley Omer MD
I edited the note

## 2025-04-21 NOTE — CONSULT NOTE ADULT - ASSESSMENT
69yMale pmh HLD, smoker presents for chest pain, patient started ASA 3/27 and reports intermittent melenic stools since. Patient has never had a GI workup before. Currently Patient denies nausea, vomiting, hematemesis, melena, blood in stool, diarrhea, constipation, abdominal pain. Patient denies N-said use.    #Intermittent Melena   just started aspirin  rectal exam-4/21/25-brown stool in rectal vault and on finger  ddx r/o malignancy, PUD, diverticulosis   Rec  -patient high risk for GI workup due to severe AS, spoke with patient regarding risks and benefits including The benefits of endoscopy as well as the risks, such as GI bleeding, aspiration pneumonia, perforation, damage or loss of teeth, reaction to medication, or death  were reviewed with the patient patient understands that he is a high cardiac risk for procedure, he wants to proceed with the GI workup  -prep patient  -npo aftermidnight, reviewed with attending tomorrow who is agreeable for procedure as well  -Maintain Hemodynamic Stability   -Monitor CBC  -CMP,Optimize Electrolytes  -PT,PTT,INR  -EKG, Chest-Xray   -Transfuse prn to hgb >8  -Two large bore IV lines  -PPI BID  -Monitor Vital Signs  -Monitor Stool For blood, frequency, consistency, melena  -Active Type and Screen  -Iron Studies, Folate, Vitamin B12 levels

## 2025-04-22 ENCOUNTER — TRANSCRIPTION ENCOUNTER (OUTPATIENT)
Age: 70
End: 2025-04-22

## 2025-04-22 ENCOUNTER — RESULT REVIEW (OUTPATIENT)
Age: 70
End: 2025-04-22

## 2025-04-22 LAB
ANION GAP SERPL CALC-SCNC: 9 MMOL/L — SIGNIFICANT CHANGE UP (ref 7–14)
APTT BLD: 35.3 SEC — SIGNIFICANT CHANGE UP (ref 27–39.2)
BUN SERPL-MCNC: 11 MG/DL — SIGNIFICANT CHANGE UP (ref 10–20)
CALCIUM SERPL-MCNC: 9 MG/DL — SIGNIFICANT CHANGE UP (ref 8.4–10.5)
CHLORIDE SERPL-SCNC: 104 MMOL/L — SIGNIFICANT CHANGE UP (ref 98–110)
CO2 SERPL-SCNC: 26 MMOL/L — SIGNIFICANT CHANGE UP (ref 17–32)
CREAT SERPL-MCNC: 0.9 MG/DL — SIGNIFICANT CHANGE UP (ref 0.7–1.5)
EGFR: 91 ML/MIN/1.73M2 — SIGNIFICANT CHANGE UP
EGFR: 91 ML/MIN/1.73M2 — SIGNIFICANT CHANGE UP
GLUCOSE SERPL-MCNC: 77 MG/DL — SIGNIFICANT CHANGE UP (ref 70–99)
HCT VFR BLD CALC: 28.7 % — LOW (ref 42–52)
HGB BLD-MCNC: 9.8 G/DL — LOW (ref 14–18)
INR BLD: 1.01 RATIO — SIGNIFICANT CHANGE UP (ref 0.65–1.3)
MCHC RBC-ENTMCNC: 29.9 PG — SIGNIFICANT CHANGE UP (ref 27–31)
MCHC RBC-ENTMCNC: 34.1 G/DL — SIGNIFICANT CHANGE UP (ref 32–37)
MCV RBC AUTO: 87.5 FL — SIGNIFICANT CHANGE UP (ref 80–94)
NRBC BLD AUTO-RTO: 0 /100 WBCS — SIGNIFICANT CHANGE UP (ref 0–0)
PLATELET # BLD AUTO: 156 K/UL — SIGNIFICANT CHANGE UP (ref 130–400)
PMV BLD: 9.8 FL — SIGNIFICANT CHANGE UP (ref 7.4–10.4)
POTASSIUM SERPL-MCNC: 4.1 MMOL/L — SIGNIFICANT CHANGE UP (ref 3.5–5)
POTASSIUM SERPL-SCNC: 4.1 MMOL/L — SIGNIFICANT CHANGE UP (ref 3.5–5)
PROTHROM AB SERPL-ACNC: 11.9 SEC — SIGNIFICANT CHANGE UP (ref 9.95–12.87)
RBC # BLD: 3.28 M/UL — LOW (ref 4.7–6.1)
RBC # FLD: 13.3 % — SIGNIFICANT CHANGE UP (ref 11.5–14.5)
SODIUM SERPL-SCNC: 139 MMOL/L — SIGNIFICANT CHANGE UP (ref 135–146)
WBC # BLD: 3.62 K/UL — LOW (ref 4.8–10.8)
WBC # FLD AUTO: 3.62 K/UL — LOW (ref 4.8–10.8)

## 2025-04-22 PROCEDURE — 99233 SBSQ HOSP IP/OBS HIGH 50: CPT

## 2025-04-22 PROCEDURE — 99232 SBSQ HOSP IP/OBS MODERATE 35: CPT | Mod: GC

## 2025-04-22 PROCEDURE — 45385 COLONOSCOPY W/LESION REMOVAL: CPT

## 2025-04-22 PROCEDURE — 43239 EGD BIOPSY SINGLE/MULTIPLE: CPT | Mod: XS

## 2025-04-22 PROCEDURE — 88305 TISSUE EXAM BY PATHOLOGIST: CPT | Mod: 26

## 2025-04-22 PROCEDURE — 45380 COLONOSCOPY AND BIOPSY: CPT | Mod: XU

## 2025-04-22 PROCEDURE — 88312 SPECIAL STAINS GROUP 1: CPT | Mod: 26

## 2025-04-22 RX ADMIN — ATORVASTATIN CALCIUM 80 MILLIGRAM(S): 80 TABLET, FILM COATED ORAL at 21:27

## 2025-04-22 RX ADMIN — METOPROLOL SUCCINATE 25 MILLIGRAM(S): 50 TABLET, EXTENDED RELEASE ORAL at 05:45

## 2025-04-22 RX ADMIN — Medication 40 MILLIGRAM(S): at 05:45

## 2025-04-22 RX ADMIN — Medication 1 APPLICATION(S): at 12:22

## 2025-04-22 NOTE — DIETITIAN INITIAL EVALUATION ADULT - OTHER INFO
pt is 69 year old male with hx of high cholesterol, + smoker p/w worsening chest pain. pt admitted with r/o ACS, + severe aortic stenosis,  anemia with possible melena. s/p EGD and colonoscopy today with findings of protruding lesions in ascending colon, 3mm Polyp in transverse colon and diverticulosis.

## 2025-04-22 NOTE — DIETITIAN INITIAL EVALUATION ADULT - ORAL INTAKE PTA/DIET HISTORY
as per pt consumes a regular diet PTA with good po intake, NKFA or intolerances. pt denies any recent weight changes.       presently on a DASH/TLC diet tolerating well consumes 75% of meals

## 2025-04-22 NOTE — PROGRESS NOTE ADULT - ASSESSMENT
69-year-old male with typical anginal symptoms (pressure-like chest pain with exertion that resolves with rest), worsening in frequency/severity. Known severe LAD stenosis on recent CCTA (3/25/25) with hemodynamically significant focal mid-LAD lesion confirmed by FFR-CT. Initial troponin elevated at 7 with subsequent negative/flat troponins. ECG without acute ischemic changes. Currently hemodynamically stable and chest pain free. Clinical presentation highly consistent with unstable angina in the setting of known critical coronary stenosis.    #Acute Coronary Syndrome (ACS)/Exertional Chest Pain  metoprolol 25 q12   * Optimize lipid management with high-intensity statin: atorvastatin 80mg daily (previously on rosuvastatin 20mg)  - ASA currently on hold, pending GI recs  - Left heart catheterization with possible PCI planned once cleared by GI regarding DAPT safety    #Severe Aortic Stenosis  Newly identified severe AS on TTE per cardiology note. Patient maintains good functional status with ability to walk >4 METS. Complicates coronary intervention strategy and increases perioperative risk. Will need evaluation for possible TAVR.  - Cardiology to continue following and determine appropriate timing for coronary intervention vs. valve intervention  hypotension    #GI Bleeding/Anemia  EGD revealed gastric erythema; colonoscopy showed diverticulosis and colon polyps (resected). Current hemodynamically stable with no signs of active bleeding.  - Hold ASA and other antiplatelets pending GI recommendations from pathology results  - PPI therapy qd  - Monitor for signs of active bleeding (vital sign changes, continued drop in hemoglobin, melena)    Hyperlipidemia  Tobacco Use    Prophylaxis/General Care  - DVT prophylaxis: enoxaparin 40mg subcutaneous daily  - GI prophylaxis: pantoprazole 40mg qd  - Activity: up ad claire  - Diet: DASH diet;

## 2025-04-22 NOTE — CHART NOTE - NSCHARTNOTEFT_GEN_A_CORE
patient for GI workup aware of high risk   aware of benefits   wants to proceed, also informed wife of patients high risk for procedure she also agrees to proceed

## 2025-04-22 NOTE — DIETITIAN INITIAL EVALUATION ADULT - PERTINENT MEDS FT
MEDICATIONS  (STANDING):  atorvastatin 80 milliGRAM(s) Oral at bedtime  chlorhexidine 2% Cloths 1 Application(s) Topical daily  influenza  Vaccine (HIGH DOSE) 0.5 milliLiter(s) IntraMuscular once  metoprolol succinate ER 25 milliGRAM(s) Oral daily    MEDICATIONS  (PRN):

## 2025-04-22 NOTE — PROGRESS NOTE ADULT - SUBJECTIVE AND OBJECTIVE BOX
GABBI CARDOSO 69y Male  MRN#: [id number]   Hospital Day: 2d    SUBJECTIVE  Patient is a 69y old Male who presents with a chief complaint of chest pain (21 Apr 2025 15:07)  Currently admitted to medicine with the primary diagnosis of Chest pain      INTERVAL HPI AND OVERNIGHT EVENTS:  Patient was examined and seen at bedside. This morning he is resting comfortably in bed and reports no issues or overnight events.    OBJECTIVE  PAST MEDICAL & SURGICAL HISTORY  High cholesterol      ALLERGIES:  No Known Allergies    MEDICATIONS:  STANDING MEDICATIONS  atorvastatin 80 milliGRAM(s) Oral at bedtime  chlorhexidine 2% Cloths 1 Application(s) Topical daily  influenza  Vaccine (HIGH DOSE) 0.5 milliLiter(s) IntraMuscular once  metoprolol succinate ER 25 milliGRAM(s) Oral daily  pantoprazole  Injectable 40 milliGRAM(s) IV Push two times a day    PRN MEDICATIONS      VITAL SIGNS: Last 24 Hours  T(C): 36.7 (22 Apr 2025 14:36), Max: 36.7 (22 Apr 2025 14:36)  T(F): 98 (22 Apr 2025 14:36), Max: 98 (22 Apr 2025 14:36)  HR: 63 (22 Apr 2025 14:36) (56 - 99)  BP: 148/70 (22 Apr 2025 14:36) (110/58 - 148/70)  BP(mean): 78 (22 Apr 2025 14:04) (78 - 85)  RR: 18 (22 Apr 2025 14:36) (17 - 20)  SpO2: 100% (22 Apr 2025 14:36) (96% - 100%)    LABS:                        9.8    3.62  )-----------( 156      ( 22 Apr 2025 05:33 )             28.7     04-22    139  |  104  |  11  ----------------------------<  77  4.1   |  26  |  0.9    Ca    9.0      22 Apr 2025 05:33  Mg     2.1     04-21    TPro  5.8[L]  /  Alb  3.9  /  TBili  0.8  /  DBili  x   /  AST  18  /  ALT  16  /  AlkPhos  111  04-21    PT/INR - ( 22 Apr 2025 05:33 )   PT: 11.90 sec;   INR: 1.01 ratio         PTT - ( 22 Apr 2025 05:33 )  PTT:35.3 sec  Urinalysis Basic - ( 22 Apr 2025 05:33 )    Color: x / Appearance: x / SG: x / pH: x  Gluc: 77 mg/dL / Ketone: x  / Bili: x / Urobili: x   Blood: x / Protein: x / Nitrite: x   Leuk Esterase: x / RBC: x / WBC x   Sq Epi: x / Non Sq Epi: x / Bacteria: x                RADIOLOGY:      PHYSICAL EXAM:  Gen: NAD  HEENT: PERRL, EOMI, mouth clr, nose clr  Neck: no nodes, no JVD, thyroid nl  lungs: clr  hrt: s1 s2 rrr no murmur  abd: soft, NT/ND, no HS megaly  ext: no edema, no c/c  neuro: aa ox3, cn intact, can move all 4 ext

## 2025-04-22 NOTE — DIETITIAN INITIAL EVALUATION ADULT - PERTINENT LABORATORY DATA
04-22    139  |  104  |  11  ----------------------------<  77  4.1   |  26  |  0.9    Ca    9.0      22 Apr 2025 05:33  Mg     2.1     04-21    TPro  5.8[L]  /  Alb  3.9  /  TBili  0.8  /  DBili  x   /  AST  18  /  ALT  16  /  AlkPhos  111  04-21  A1C with Estimated Average Glucose Result: 4.6 % (04-21-25 @ 06:01)                          9.8    3.62  )-----------( 156      ( 22 Apr 2025 05:33 )             28.7

## 2025-04-23 LAB
ABO RH CONFIRMATION: SIGNIFICANT CHANGE UP
ANION GAP SERPL CALC-SCNC: 8 MMOL/L — SIGNIFICANT CHANGE UP (ref 7–14)
APTT BLD: 34.1 SEC — SIGNIFICANT CHANGE UP (ref 27–39.2)
BLD GP AB SCN SERPL QL: SIGNIFICANT CHANGE UP
BUN SERPL-MCNC: 16 MG/DL — SIGNIFICANT CHANGE UP (ref 10–20)
CALCIUM SERPL-MCNC: 8.6 MG/DL — SIGNIFICANT CHANGE UP (ref 8.4–10.5)
CHLORIDE SERPL-SCNC: 105 MMOL/L — SIGNIFICANT CHANGE UP (ref 98–110)
CO2 SERPL-SCNC: 27 MMOL/L — SIGNIFICANT CHANGE UP (ref 17–32)
CREAT SERPL-MCNC: 1.1 MG/DL — SIGNIFICANT CHANGE UP (ref 0.7–1.5)
EGFR: 72 ML/MIN/1.73M2 — SIGNIFICANT CHANGE UP
EGFR: 72 ML/MIN/1.73M2 — SIGNIFICANT CHANGE UP
GLUCOSE SERPL-MCNC: 88 MG/DL — SIGNIFICANT CHANGE UP (ref 70–99)
HCT VFR BLD CALC: 27.4 % — LOW (ref 42–52)
HGB BLD-MCNC: 9.3 G/DL — LOW (ref 14–18)
INR BLD: 0.95 RATIO — SIGNIFICANT CHANGE UP (ref 0.65–1.3)
MCHC RBC-ENTMCNC: 29.3 PG — SIGNIFICANT CHANGE UP (ref 27–31)
MCHC RBC-ENTMCNC: 33.9 G/DL — SIGNIFICANT CHANGE UP (ref 32–37)
MCV RBC AUTO: 86.4 FL — SIGNIFICANT CHANGE UP (ref 80–94)
NRBC BLD AUTO-RTO: 0 /100 WBCS — SIGNIFICANT CHANGE UP (ref 0–0)
PLATELET # BLD AUTO: 166 K/UL — SIGNIFICANT CHANGE UP (ref 130–400)
PMV BLD: 9.9 FL — SIGNIFICANT CHANGE UP (ref 7.4–10.4)
POTASSIUM SERPL-MCNC: 4.7 MMOL/L — SIGNIFICANT CHANGE UP (ref 3.5–5)
POTASSIUM SERPL-SCNC: 4.7 MMOL/L — SIGNIFICANT CHANGE UP (ref 3.5–5)
PROTHROM AB SERPL-ACNC: 11.2 SEC — SIGNIFICANT CHANGE UP (ref 9.95–12.87)
RBC # BLD: 3.17 M/UL — LOW (ref 4.7–6.1)
RBC # FLD: 13.1 % — SIGNIFICANT CHANGE UP (ref 11.5–14.5)
SODIUM SERPL-SCNC: 140 MMOL/L — SIGNIFICANT CHANGE UP (ref 135–146)
WBC # BLD: 3.69 K/UL — LOW (ref 4.8–10.8)
WBC # FLD AUTO: 3.69 K/UL — LOW (ref 4.8–10.8)

## 2025-04-23 PROCEDURE — 99233 SBSQ HOSP IP/OBS HIGH 50: CPT

## 2025-04-23 PROCEDURE — 99232 SBSQ HOSP IP/OBS MODERATE 35: CPT | Mod: GC

## 2025-04-23 RX ORDER — ENOXAPARIN SODIUM 100 MG/ML
40 INJECTION SUBCUTANEOUS EVERY 24 HOURS
Refills: 0 | Status: DISCONTINUED | OUTPATIENT
Start: 2025-04-24 | End: 2025-04-25

## 2025-04-23 RX ORDER — ASPIRIN 325 MG
81 TABLET ORAL DAILY
Refills: 0 | Status: DISCONTINUED | OUTPATIENT
Start: 2025-04-23 | End: 2025-04-25

## 2025-04-23 RX ADMIN — Medication 40 MILLIGRAM(S): at 17:41

## 2025-04-23 RX ADMIN — ATORVASTATIN CALCIUM 80 MILLIGRAM(S): 80 TABLET, FILM COATED ORAL at 22:20

## 2025-04-23 RX ADMIN — Medication 1 APPLICATION(S): at 15:14

## 2025-04-23 RX ADMIN — Medication 81 MILLIGRAM(S): at 15:13

## 2025-04-23 RX ADMIN — METOPROLOL SUCCINATE 25 MILLIGRAM(S): 50 TABLET, EXTENDED RELEASE ORAL at 05:47

## 2025-04-23 NOTE — PROGRESS NOTE ADULT - ASSESSMENT
69[facility], smoker presents for chest pain, patient started ASA 3/27 and reports intermittent melenic stools since. Patient has never had a GI workup before. Currently Patient denies nausea, vomiting, hematemesis, melena, blood in stool, diarrhea, constipation, abdominal pain. Patient denies N-said use.    #Intermittent Melena   just started aspirin  rectal exam-4/21/25-brown stool in rectal vault and on finger  s/p EGD/Colonoscopy 4/22/2025 Dr. Frost  EGD Impressions:  -Normal esophagus.  -Normal mucosa in the whole examined duodenum. (Biopsy).  -Erythema in the stomach. (Biopsy).  Colonoscopy Impressions:  -Polyp (6 mm) in theascending colon. (Polypectomy).  -Terminal Ileum Visualized.  -Ileocecal Valve Visualized.  -Internal hemorrhoids.  -Polyp (3 mm) in the transverse colon. (Polypectomy).  -Diverticulosis of the the left side of the colon.  Rec  s/p GI workup as above  -Colonoscopy in 5 years, (colon polyps)  -Await pathology results.  -Follow up next available at the office.  -proceed with cardiac workup   -can proceed with aspirin and plavix if needed with PPI BID  -Maintain Hemodynamic Stability   -Monitor CBC  -CMP,Optimize Electrolytes  -PT,PTT,INR  -EKG, Chest-Xray   -Transfuse prn to hgb >8  -Two large bore IV lines  -PPI BID  -Monitor Vital Signs  -Monitor Stool For blood, frequency, consistency, melena  -Active Type and Screen  -Iron Studies, Folate, Vitamin B12 levels       Recall GI if needed  69yMale [facility] HLD, smoker presents for chest pain, patient started ASA 3/27 and reports intermittent melenic stools since. Patient has never had a GI workup before. Currently Patient denies nausea, vomiting, hematemesis, melena, blood in stool, diarrhea, constipation, abdominal pain. Patient denies N-said use.    #Intermittent Melena   #Iron deficiency anemia  just started aspirin  rectal exam-4/21/25-brown stool in rectal vault and on finger  s/p EGD/Colonoscopy 4/22/2025 Dr. Frost  EGD Impressions:  -Normal esophagus.  -Normal mucosa in the whole examined duodenum. (Biopsy).  -Erythema in the stomach. (Biopsy).  Colonoscopy Impressions:  -Polyp (6 mm) in theascending colon. (Polypectomy).  -Terminal Ileum Visualized.  -Ileocecal Valve Visualized.  -Internal hemorrhoids.  -Polyp (3 mm) in the transverse colon. (Polypectomy).  -Diverticulosis of the the left side of the colon.  Rec  s/p GI workup as above  -Colonoscopy in 5 years, (colon polyps)  -Await pathology results.  -Follow up next available at the office including for possible VCE  -proceed with cardiac workup   -can proceed with aspirin and plavix if needed with PPI BID  -Maintain Hemodynamic Stability   -Monitor CBC  -CMP,Optimize Electrolytes  -PT,PTT,INR  -EKG, Chest-Xray   -Transfuse prn to hgb >8  -Two large bore IV lines  -PPI BID  -Monitor Vital Signs  -Monitor Stool For blood, frequency, consistency, melena  -Active Type and Screen  -Iron Studies, Folate, Vitamin B12 levels       Recall GI if needed

## 2025-04-23 NOTE — PROGRESS NOTE ADULT - ASSESSMENT
69-year-old male with typical anginal symptoms (pressure-like chest pain with exertion that resolves with rest), worsening in frequency/severity. Known severe LAD stenosis on recent CCTA (3/25/25) with hemodynamically significant focal mid-LAD lesion confirmed by FFR-CT. Initial troponin elevated at 7 with subsequent negative/flat troponins. ECG without acute ischemic changes. Currently hemodynamically stable and chest pain free. Clinical presentation highly consistent with unstable angina in the setting of known critical coronary stenosis.    # Acute Coronary Syndrome (ACS)/Exertional Chest Pain  metoprolol 25 q12   * Optimize lipid management with high-intensity statin: atorvastatin 80mg daily (previously on rosuvastatin 20mg)  - Left heart catheterization with possible PCI planned once cleared by GI regarding DAPT safety  - follow up with GI to resume aspirin    # Severe Aortic Stenosis  Newly identified severe AS on TTE per cardiology note. Patient maintains good functional status with ability to walk >4 METS. Complicates coronary intervention strategy and increases perioperative risk. Will need evaluation for possible TAVR.  - Cardiology to continue following and determine appropriate timing for coronary intervention vs. valve intervention  hypotension    # GI Bleeding/Anemia  EGD revealed gastric erythema; colonoscopy showed diverticulosis and colon polyps (resected). Current hemodynamically stable with no signs of active bleeding.  - Hold ASA and other antiplatelets pending GI recommendations  - PPI therapy qd  - Monitor for signs of active bleeding     Hyperlipidemia  Tobacco Use    Prophylaxis/General Care  - DVT prophylaxis: enoxaparin 40mg subcutaneous daily  - GI prophylaxis: pantoprazole 40mg qd  - Activity: as tolerated  - Diet: DASH diet

## 2025-04-23 NOTE — PROGRESS NOTE ADULT - NS ATTEND AMEND GEN_ALL_CORE FT
Patient seen and examined. Pertinent labs, imaging and telemetry reviewed. I agree with the above:     Patient feeling well at rest.   -Still with chest discomfort and SOB with activity.   -HD stable and Hgb stable.   -Pt with melena after starting ASA.   -TTE showing normal LVEF with severe AS.   -Pt with CAD and severe AS.   -Patient is high risk for low risk procedure.   -GI work up needed prior to LHC and PCI in order to take DAPT if needed with PCI.   -Continue to monitor Hgb. Transfuse for Hgb <8.   -Will need structural heart eval as well.   -Likely transfer 4T after GI work up.
68yo male with intermittent dark stools and anemia s/p EGD/colonoscopy. Continue PPI and cardiology workup as deemed indicated. Can follow up as outpt for possible VCE.

## 2025-04-23 NOTE — PROGRESS NOTE ADULT - SUBJECTIVE AND OBJECTIVE BOX
DATE OF SERVICE: 04-23-25    Cardio Progress Note  Risk Stratification for GI Intervention    Patient denies chest pain or sob; ros otherwise negative.     GENERAL:  70y/o Male NAD, resting comfortably.  HEAD:  Atraumatic, Normocephalic  EYES: EOMI, PERRLA, conjunctiva and sclera clear  NECK: Supple, No JVD, no cervical lymphadenopathy, non-tender  CHEST/LUNG: Clear to auscultation bilaterally; No wheeze, rhonchi, or rales  HEART: Regular rate and rhythm; S1&S2  ABDOMEN: Soft, Nontender, Nondistended x 4 quadrants; Bowel sounds present  EXTREMITIES:   Peripheral Pulses Present, No clubbing, no cyanosis, or no edema, no calf tenderness  PSYCH: AAOx3, cooperative, appropriate  NEUROLOGY: WNL  SKIN: WNLWt(kg): --    I&O's Summary    20 Apr 2025 07:01  -  21 Apr 2025 07:00  --------------------------------------------------------  IN: 240 mL / OUT: 575 mL / NET: -335 mL    < from: TTE Echo Complete w/o Contrast w/ Doppler (04.21.25 @ 11:53) >    Summary:   1. Normal global left ventricular systolic function.   2. LV Ejection Fraction by Otero's Method with a biplane EF of 63 %.   3. Mild concentric left ventricular hypertrophy.   4. Spectral Doppler shows impaired relaxation pattern of left   ventricular myocardial filling (Grade I diastolic dysfunction).   5. Normalright ventricular size and function.   6. Normal left atrial size.   7. Normal right atrial size.   8. Severe aortic valve stenosis.   9. PV 3.99 m/s, PG 64 mmHg, MG 35 mmHg, JOEL by continuity 0.9 cm2, AVAi   0.49 cm2/m2, DI 0.23.  10. Mild aortic regurgitation.  11. Trace mitral valve regurgitation.  12. Normal pulmonary artery pressure.  13. There is no evidence of pericardial effusion.      < end of copied text >  < from: 12 Lead ECG (04.21.25 @ 07:52) >    Diagnosis Line Sinus bradycardia  Left anterior fascicular block  Minimal voltage criteria for LVH, may be normal variant ( [place] product )  Abnormal ECG    Confirmed by Lopez Rodríguez ([id number]) on 4/21/2025 10:14:35 AM    < end of copied text >    A/P:  69 year old male with history of HTN, CAD admitted with chest pain and melena.   - Pt. tolerated GI procedure well from cv perspective   - Will eventually need cardiac cath to evaluate abnormal CCTA  - Appreciate GI evaluation   - Pt. cleared for DAPT with GI ppx with PPI   - Will plan on cardiac cath this week for further evaluation     Westley Omer MD      DATE OF SERVICE: 04-23-25    Cardio Progress Note  Risk Stratification for GI Intervention    Patient denies chest pain or sob; ros otherwise negative.     GENERAL:  70y/o Male NAD, resting comfortably.  HEAD:  Atraumatic, Normocephalic  EYES: EOMI, PERRLA, conjunctiva and sclera clear  NECK: Supple, No JVD, no cervical lymphadenopathy, non-tender  CHEST/LUNG: Clear to auscultation bilaterally; No wheeze, rhonchi, or rales  HEART: Regular rate and rhythm; S1&S2  ABDOMEN: Soft, Nontender, Nondistended x 4 quadrants; Bowel sounds present  EXTREMITIES:   Peripheral Pulses Present, No clubbing, no cyanosis, or no edema, no calf tenderness  PSYCH: AAOx3, cooperative, appropriate  NEUROLOGY: WNL  SKIN: WNLWt(kg): --    I&O's Summary    20 Apr 2025 07:01  -  21 Apr 2025 07:00  --------------------------------------------------------  IN: 240 mL / OUT: 575 mL / NET: -335 mL    < from: TTE Echo Complete w/o Contrast w/ Doppler (04.21.25 @ 11:53) >    Summary:   1. Normal global left ventricular systolic function.   2. LV Ejection Fraction by Otero's Method with a biplane EF of 63 %.   3. Mild concentric left ventricular hypertrophy.   4. Spectral Doppler shows impaired relaxation pattern of left   ventricular myocardial filling (Grade I diastolic dysfunction).   5. Normalright ventricular size and function.   6. Normal left atrial size.   7. Normal right atrial size.   8. Severe aortic valve stenosis.   9. PV 3.99 m/s, PG 64 mmHg, MG 35 mmHg, JOEL by continuity 0.9 cm2, AVAi   0.49 cm2/m2, DI 0.23.  10. Mild aortic regurgitation.  11. Trace mitral valve regurgitation.  12. Normal pulmonary artery pressure.  13. There is no evidence of pericardial effusion.      < end of copied text >  < from: 12 Lead ECG (04.21.25 @ 07:52) >    Diagnosis Line Sinus bradycardia  Left anterior fascicular block  Minimal voltage criteria for LVH, may be normal variant ( [place] product )  Abnormal ECG    Confirmed by Lopez Rodríguez ([id number]) on 4/21/2025 10:14:35 AM    < end of copied text >    A/P:  69 year old male with history of HTN, CAD admitted with chest pain and melena.   - Pt. tolerated GI procedure well from cv perspective   - Will eventually need cardiac cath to evaluate abnormal CCTA  - Appreciate GI evaluation   - Pt. cleared for DAPT with GI ppx with PPI   - Will plan on cardiac cath this week for further evaluation   - TTE with severe AS - will consult structural heart for further evaluation     Westley Omer MD

## 2025-04-23 NOTE — PROGRESS NOTE ADULT - SUBJECTIVE AND OBJECTIVE BOX
Patient is a 69y old  Male who presents with a chief complaint of chest pain (22 Apr 2025 22:32)    Overnight events: on RA. off pressor. s/p EGD yesterday. no event overnight    Drips: none          ROS: as in HPI; All other systems reviewed are negative.        PHYSICAL EXAM  Vital Signs Last 24 Hrs  T(C): 36.3 (23 Apr 2025 07:01), Max: 36.7 (22 Apr 2025 14:36)  T(F): 97.3 (23 Apr 2025 07:01), Max: 98 (22 Apr 2025 14:36)  HR: 59 (23 Apr 2025 07:13) (57 - 89)  BP: 120/59 (23 Apr 2025 07:13) (108/59 - 169/78)  BP(mean): 85 (23 Apr 2025 07:13) (78 - 104)  RR: 20 (23 Apr 2025 07:13) (17 - 22)  SpO2: 98% (23 Apr 2025 07:13) (98% - 100%)    Parameters below as of 23 Apr 2025 07:13  Patient On (Oxygen Delivery Method): room air          CONSTITUTIONAL:  NAD    ENT:   Airway patent,     EYES:   Clear bilaterally,   pupils equal,   round and reactive to light.    CARDIAC:   Normal rate,   regular rhythm.    no edema    RESPIRATORY:   No wheezing   Normal chest expansion  Not tachypneic,    GASTROINTESTINAL:  Abdomen soft, non-tender,   No guarding,   Positive BS    MUSCULOSKELETAL:   Range of motion is not limited,    NEUROLOGICAL:   Alert and oriented   No motor deficits.    SKIN:   Skin normal color for race,   No evidence of rash.                I&O's Detail        LABS:                        9.3    3.69  )-----------( 166      ( 23 Apr 2025 05:54 )             27.4     23 Apr 2025 05:54    140    |  105    |  16     ----------------------------<  88     4.7     |  27     |  1.1      Ca    8.6        23 Apr 2025 05:54            CAPILLARY BLOOD GLUCOSE        PT/INR - ( 22 Apr 2025 05:33 )   PT: 11.90 sec;   INR: 1.01 ratio         PTT - ( 22 Apr 2025 05:33 )  PTT:35.3 sec  Urinalysis Basic - ( 23 Apr 2025 05:54 )    Color: x / Appearance: x / SG: x / pH: x  Gluc: 88 mg/dL / Ketone: x  / Bili: x / Urobili: x   Blood: x / Protein: x / Nitrite: x   Leuk Esterase: x / RBC: x / WBC x   Sq Epi: x / Non Sq Epi: x / Bacteria: x      Culture        MEDICATIONS  (STANDING):  atorvastatin 80 milliGRAM(s) Oral at bedtime  chlorhexidine 2% Cloths 1 Application(s) Topical daily  influenza  Vaccine (HIGH DOSE) 0.5 milliLiter(s) IntraMuscular once  metoprolol succinate ER 25 milliGRAM(s) Oral daily    MEDICATIONS  (PRN):

## 2025-04-23 NOTE — CHART NOTE - NSCHARTNOTEFT_GEN_A_CORE
Patient is scheduled for Cardiac Catheterization on Friday 4/25/2025  Please keep NPO p MN Thursday  Coordinate time and transportation with Cath Lab ([id number]) Friday morning Cardiac Catheterization- Thursday 4/24/2025  Please keep NPO p MN tonight  Coordinate time and transportation with Cath Lab in am ([id number])

## 2025-04-23 NOTE — PROGRESS NOTE ADULT - SUBJECTIVE AND OBJECTIVE BOX
69yMale  Being followed for anemia no gross GI bleeding  Interval history: Patient denies nausea, vomiting, hematemesis, melena, blood in stool, diarrhea, constipation, abdominal pain. Patient s/p EGD/Colonoscopy doing well.      PAST MEDICAL & SURGICAL HISTORY:   High cholesterol      Social History: No smoking. No alcohol. No illegal drug use.            MEDICATIONS  (STANDING):  atorvastatin 80 milliGRAM(s) Oral at bedtime  chlorhexidine 2% Cloths 1 Application(s) Topical daily  influenza  Vaccine (HIGH DOSE) 0.5 milliLiter(s) IntraMuscular once  metoprolol succinate ER 25 milliGRAM(s) Oral daily        Allergies:   No Known Allergies  Intolerances          REVIEW OF SYSTEMS:  General:  No weight loss, fevers, or chills.  Eyes:  No reported pain or visual changes  ENT:  No sore throat or runny nose.  NECK: No stiffness   CV:  No chest pain or palpitations.  Resp:  No shortness of breath, cough  GI:  No abdominal pain, nausea, vomiting, dysphagia, diarrhea or constipation. No rectal bleeding, melena, or hematemesis.  Neuro:  No tingling, numbness         VITAL SIGNS:   T(F): 97.3 (25 @ 07:01), Max: 98 (25 @ 14:36)  HR: 59 (25 @ 07:13) (57 - 89)  BP: 120/59 (25 @ 07:13) (108/59 - 169/78)  RR: 20 (25 @ 07:13) (17 - 22)  SpO2: 98% (25 @ 07:13) (98% - 100%)    PHYSICAL EXAM:  GENERAL: AAOx3, no acute distress.  HEAD:  Atraumatic, Normocephalic  EYES: conjunctiva and sclera clear  NECK: Supple, no thyromegaly  CHEST/LUNG: Clear to auscultation bilaterally; No wheeze, rhonchi, or rales  HEART: Regular rate and rhythm; normal S1, S2, No murmurs.  ABDOMEN: Soft, nontender, nondistended; Bowel sounds present  NEUROLOGY: No asterixis or tremor.   SKIN: Intact, no jaundice            LABS:                        9.3    3.69  )-----------( 166      ( 2025 05:54 )             27.4         140  |  105  |  16  ----------------------------<  88  4.7   |  27  |  1.1    Ca    8.6      2025 05:54        PT/INR - ( 2025 05:33 )   PT: 11.90 sec;   INR: 1.01 ratio         PTT - ( 2025 05:33 )  PTT:35.3 sec    IMAGING:    < from: EGD-Colonoscopy (25 @ 11:30) >  EGD-Colonoscopy Report    Date: 2025 11:30 AM    Patient Name: GABBI CARDOSO    MRN: [id number]    Account Number:    [id number]    Gender: Male     (age): 1955 (69)    EGD Instrument(s):    GIF- ([id number])([id number])    Colonoscopy Instrument(s):    PCF-190 ([id number])([id number])    Attending/Fellow:    Margot Sevilla MD            Nurse(s):    christa Szymanski RN    EGD Indications:    CELINE (iron deficiency anemia) - D50.9    Colonoscopy Indications:    CELINE (iron deficiency anemia) - D50.9    General Procedure:    The procedure, indications, preparation and potential complications were  explained to the patient, who indicated understanding and signed the  corresponding consent forms. MAC was administered. Continuous pulse oximetry and  blood pressure monitoring were used throughout the procedure. Supplemental  oxygen was used.    EGD    EGD Procedure:    Patient was placed in left lateral decubitus position. The endoscope was  introduced through mouth and advanced under direct visualization until second  part of the duodenum reached. Patient tolerance to the procedure was good. The  procedure was not difficult.    EGD Findings:    Esophagus Normal esophagus.    Stomach Mucosa Erythema of the mucosa was noted in the stomach.Multiple cold  forceps biopsies were performed for histology.    Duodenum Mucosa Normal mucosa was noted in the whole examined duodenum. Random  mucosal biopsies were obtained to evaluate for histologic features of celiac  disease.Multiple cold forcepsbiopsies were performed for histology in the  second part of the duodenum.    EGD Impressions:    Normal esophagus.    Normal mucosa in the whole examined duodenum. (Biopsy).    Erythema in the stomach. (Biopsy).    EGD Limitations/Complications:    There were no apparent limitations or complications    Colonoscopy    Colonoscopy Procedure:    The quality of preparation was Good. Patient was placed in left lateral  decubitus position. The colonoscope was introduced through rectum and advanced  under direct visualization until cecum reached. The appendiceal orifice and the  ileocecal valve were identified. The terminal ileum was identified. The  colonoscope was retroflexed within the rectum. Careful visualization was  performed as the instrument was withdrawn. Patient tolerance to the procedure  was excellent. The procedure was not difficult. Digital exam was normal.    Wingo Bowel Preparation Score:    Using the Wingo Bowel Preparation Score, each segment of the colon was graded  as follows:    Left Colon: Excellent, 3 | Transverse Colon: Excellent, 3  | Right Colon:  Excellent, 3    Colonoscopy Findings:    Excavated lesions A few diverticula were seen in the the left side of the colon.      Protruding lesions A single sessile 6 mmnon-bleeding polyp of benign appearance  was found in the ascending colon.A single-piece polypectomy was performed using  a cold snare. The polyp was completely removed.    Internal hemorrhoids were noted.    A single sessile 3 mm polyp was found in the transverse colon.A single-piece  polypectomy was performed using a cold forceps. The polyp was completely  removed.    Additional findings Terminal Ileum Visualized.    Ileocecal Valve Visualized.    Colonoscopy Impressions:    Polyp (6 mm) in theascending colon. (Polypectomy).    Terminal Ileum Visualized.    Ileocecal Valve Visualized.    Internal hemorrhoids.    Polyp (3 mm) in the transverse colon. (Polypectomy).    Diverticulosis of the the left side of the colon.        Colonoscopy Limitations/Complications:    There were no apparent limitations or complications    Plan:    Colonoscopy in 5 years, (colon polyps)    Await pathology results.    Follow up next available at the office.    Attending Statement:    I was present and participated during the entire procedure, including non-key  portions.    Margot Sevilla MD    Version 1, Electronically signed on 2025 2:11:48 PM by Margot Sevilla MD    < end of copied text >

## 2025-04-24 ENCOUNTER — TRANSCRIPTION ENCOUNTER (OUTPATIENT)
Age: 70
End: 2025-04-24

## 2025-04-24 LAB
ALBUMIN SERPL ELPH-MCNC: 4 G/DL — SIGNIFICANT CHANGE UP (ref 3.5–5.2)
ALP SERPL-CCNC: 122 U/L — HIGH (ref 30–115)
ALT FLD-CCNC: 15 U/L — SIGNIFICANT CHANGE UP (ref 0–41)
ANION GAP SERPL CALC-SCNC: 8 MMOL/L — SIGNIFICANT CHANGE UP (ref 7–14)
ANION GAP SERPL CALC-SCNC: 9 MMOL/L — SIGNIFICANT CHANGE UP (ref 7–14)
APTT BLD: 33 SEC — SIGNIFICANT CHANGE UP (ref 27–39.2)
AST SERPL-CCNC: 20 U/L — SIGNIFICANT CHANGE UP (ref 0–41)
BASOPHILS # BLD AUTO: 0.04 K/UL — SIGNIFICANT CHANGE UP (ref 0–0.2)
BASOPHILS NFR BLD AUTO: 1.2 % — HIGH (ref 0–1)
BILIRUB SERPL-MCNC: 1 MG/DL — SIGNIFICANT CHANGE UP (ref 0.2–1.2)
BUN SERPL-MCNC: 11 MG/DL — SIGNIFICANT CHANGE UP (ref 10–20)
BUN SERPL-MCNC: 12 MG/DL — SIGNIFICANT CHANGE UP (ref 10–20)
CALCIUM SERPL-MCNC: 8.7 MG/DL — SIGNIFICANT CHANGE UP (ref 8.4–10.5)
CALCIUM SERPL-MCNC: 9 MG/DL — SIGNIFICANT CHANGE UP (ref 8.4–10.5)
CHLORIDE SERPL-SCNC: 103 MMOL/L — SIGNIFICANT CHANGE UP (ref 98–110)
CHLORIDE SERPL-SCNC: 105 MMOL/L — SIGNIFICANT CHANGE UP (ref 98–110)
CO2 SERPL-SCNC: 23 MMOL/L — SIGNIFICANT CHANGE UP (ref 17–32)
CO2 SERPL-SCNC: 27 MMOL/L — SIGNIFICANT CHANGE UP (ref 17–32)
CREAT SERPL-MCNC: 0.8 MG/DL — SIGNIFICANT CHANGE UP (ref 0.7–1.5)
CREAT SERPL-MCNC: 1 MG/DL — SIGNIFICANT CHANGE UP (ref 0.7–1.5)
EGFR: 80 ML/MIN/1.73M2 — SIGNIFICANT CHANGE UP
EGFR: 80 ML/MIN/1.73M2 — SIGNIFICANT CHANGE UP
EGFR: 95 ML/MIN/1.73M2 — SIGNIFICANT CHANGE UP
EGFR: 95 ML/MIN/1.73M2 — SIGNIFICANT CHANGE UP
EOSINOPHIL # BLD AUTO: 0.17 K/UL — SIGNIFICANT CHANGE UP (ref 0–0.7)
EOSINOPHIL NFR BLD AUTO: 4.9 % — SIGNIFICANT CHANGE UP (ref 0–8)
GLUCOSE SERPL-MCNC: 87 MG/DL — SIGNIFICANT CHANGE UP (ref 70–99)
GLUCOSE SERPL-MCNC: 88 MG/DL — SIGNIFICANT CHANGE UP (ref 70–99)
HCT VFR BLD CALC: 30.1 % — LOW (ref 42–52)
HGB BLD-MCNC: 10.2 G/DL — LOW (ref 14–18)
IMM GRANULOCYTES NFR BLD AUTO: 0.3 % — SIGNIFICANT CHANGE UP (ref 0.1–0.3)
INR BLD: 0.96 RATIO — SIGNIFICANT CHANGE UP (ref 0.65–1.3)
LYMPHOCYTES # BLD AUTO: 0.79 K/UL — LOW (ref 1.2–3.4)
LYMPHOCYTES # BLD AUTO: 22.8 % — SIGNIFICANT CHANGE UP (ref 20.5–51.1)
MCHC RBC-ENTMCNC: 29.7 PG — SIGNIFICANT CHANGE UP (ref 27–31)
MCHC RBC-ENTMCNC: 33.9 G/DL — SIGNIFICANT CHANGE UP (ref 32–37)
MCV RBC AUTO: 87.5 FL — SIGNIFICANT CHANGE UP (ref 80–94)
MONOCYTES # BLD AUTO: 0.37 K/UL — SIGNIFICANT CHANGE UP (ref 0.1–0.6)
MONOCYTES NFR BLD AUTO: 10.7 % — HIGH (ref 1.7–9.3)
NEUTROPHILS # BLD AUTO: 2.09 K/UL — SIGNIFICANT CHANGE UP (ref 1.4–6.5)
NEUTROPHILS NFR BLD AUTO: 60.1 % — SIGNIFICANT CHANGE UP (ref 42.2–75.2)
NRBC BLD AUTO-RTO: 0 /100 WBCS — SIGNIFICANT CHANGE UP (ref 0–0)
PLATELET # BLD AUTO: 161 K/UL — SIGNIFICANT CHANGE UP (ref 130–400)
PMV BLD: 10.1 FL — SIGNIFICANT CHANGE UP (ref 7.4–10.4)
POTASSIUM SERPL-MCNC: 3.8 MMOL/L — SIGNIFICANT CHANGE UP (ref 3.5–5)
POTASSIUM SERPL-MCNC: 4.6 MMOL/L — SIGNIFICANT CHANGE UP (ref 3.5–5)
POTASSIUM SERPL-SCNC: 3.8 MMOL/L — SIGNIFICANT CHANGE UP (ref 3.5–5)
POTASSIUM SERPL-SCNC: 4.6 MMOL/L — SIGNIFICANT CHANGE UP (ref 3.5–5)
PROT SERPL-MCNC: 5.9 G/DL — LOW (ref 6–8)
PROTHROM AB SERPL-ACNC: 11.3 SEC — SIGNIFICANT CHANGE UP (ref 9.95–12.87)
RBC # BLD: 3.44 M/UL — LOW (ref 4.7–6.1)
RBC # FLD: 13.2 % — SIGNIFICANT CHANGE UP (ref 11.5–14.5)
SODIUM SERPL-SCNC: 135 MMOL/L — SIGNIFICANT CHANGE UP (ref 135–146)
SODIUM SERPL-SCNC: 140 MMOL/L — SIGNIFICANT CHANGE UP (ref 135–146)
SURGICAL PATHOLOGY STUDY: SIGNIFICANT CHANGE UP
SURGICAL PATHOLOGY STUDY: SIGNIFICANT CHANGE UP
WBC # BLD: 3.47 K/UL — LOW (ref 4.8–10.8)
WBC # FLD AUTO: 3.47 K/UL — LOW (ref 4.8–10.8)

## 2025-04-24 PROCEDURE — 92928 PRQ TCAT PLMT NTRAC ST 1 LES: CPT | Mod: LD

## 2025-04-24 PROCEDURE — 93458 L HRT ARTERY/VENTRICLE ANGIO: CPT | Mod: 26,XU

## 2025-04-24 PROCEDURE — 93010 ELECTROCARDIOGRAM REPORT: CPT

## 2025-04-24 PROCEDURE — 93571 IV DOP VEL&/PRESS C FLO 1ST: CPT | Mod: 26,LD

## 2025-04-24 PROCEDURE — 99232 SBSQ HOSP IP/OBS MODERATE 35: CPT | Mod: GC

## 2025-04-24 RX ORDER — IRON SUCROSE 20 MG/ML
100 INJECTION, SOLUTION INTRAVENOUS EVERY 24 HOURS
Refills: 0 | Status: DISCONTINUED | OUTPATIENT
Start: 2025-04-24 | End: 2025-04-25

## 2025-04-24 RX ORDER — CLOPIDOGREL BISULFATE 75 MG/1
75 TABLET, FILM COATED ORAL DAILY
Refills: 0 | Status: DISCONTINUED | OUTPATIENT
Start: 2025-04-25 | End: 2025-04-25

## 2025-04-24 RX ADMIN — Medication 81 MILLIGRAM(S): at 11:02

## 2025-04-24 RX ADMIN — ENOXAPARIN SODIUM 40 MILLIGRAM(S): 100 INJECTION SUBCUTANEOUS at 21:00

## 2025-04-24 RX ADMIN — ATORVASTATIN CALCIUM 80 MILLIGRAM(S): 80 TABLET, FILM COATED ORAL at 21:00

## 2025-04-24 RX ADMIN — IRON SUCROSE 210 MILLIGRAM(S): 20 INJECTION, SOLUTION INTRAVENOUS at 20:59

## 2025-04-24 RX ADMIN — METOPROLOL SUCCINATE 25 MILLIGRAM(S): 50 TABLET, EXTENDED RELEASE ORAL at 06:02

## 2025-04-24 NOTE — DISCHARGE NOTE PROVIDER - CARE PROVIDERS DIRECT ADDRESSES
,[email or web address]

## 2025-04-24 NOTE — CHART NOTE - NSCHARTNOTESELECT_GEN_ALL_CORE
Brief cath report
Cardiology NP/Event Note
Cardiology/Event Note
Interventional Cardiology/Event Note
GI updates/Event Note

## 2025-04-24 NOTE — PROGRESS NOTE ADULT - ASSESSMENT
69-year-old male with typical anginal symptoms (pressure-like chest pain with exertion that resolves with rest), worsening in frequency/severity. Known severe LAD stenosis on recent CCTA (3/25/25) with hemodynamically significant focal mid-LAD lesion confirmed by FFR-CT. Initial troponin elevated at 7 with subsequent negative/flat troponins. ECG without acute ischemic changes. Currently hemodynamically stable and chest pain free. Clinical presentation highly consistent with unstable angina in the setting of known critical coronary stenosis.    # Acute Coronary Syndrome (ACS)/Exertional Chest Pain  [facility] today  being transferred to [facility]    # Severe Aortic Stenosis  Newly identified severe AS on TTE per cardiology note. Patient maintains good functional status with ability to walk >4 METS. Complicates coronary intervention strategy and increases perioperative risk. Will need evaluation for possible TAVR.  cardiology following    # GI Bleeding/Anemia  s/p EGD  PPO daily     Hyperlipidemia  Tobacco Use    Prophylaxis/General Care  - DVT prophylaxis: enoxaparin 40mg subcutaneous daily  - GI prophylaxis: pantoprazole 40mg qd  - Activity: as tolerated  - Diet: DASH diet

## 2025-04-24 NOTE — DISCHARGE NOTE PROVIDER - HOSPITAL COURSE
70 yo M, with PMHx of HLD, current smoker (2 cigarettes/day) presented to the ED for worsening chest pain. Yesterday, while walking on flat ground, he developed acute chest pain (described as pressure like, squeezing, associated with SOB) which resolved with rest. He denied any associated lightheadedness, dizziness, palpitations. Today, he was experiencing the same symptoms while going up and down a flight of stairs which prompted him to come to the ED. Symptoms always improved with rest, and never had these symptoms at rest. He has been having exertional chest pain for a while now but has been worsening recently. He has recently seen his cardiologist, Dr. Rodríguez and found to have an abnormal CCTA with concern of prox to mLAD severe stenosis; and FFR CT showing hemodynamically significant focal lesion in the mid LAD. Pt was scheduled for cath this week. Of note, he was started on ASA 81mg 3 weeks ago and stated that he has been having dark stools after starting ASA. Denies any BRBPR. Currently, he does not have any chest pain.     In the ED:  Vitals: · BP Systolic	149 mm Hg  · BP Diastolic	71 mm Hg  · Heart Rate	65 /min  · Respiration Rate (breaths/min)	20 /min  · Temp (F)	98 Degrees F  · Temp (C)	36.7 Degrees C  · Temp site	oral  · SpO2 (%)	99 %  · O2 Delivery/Oxygen Delivery Method	room air  · Temp at ED Arrival (C)	36.7 Degrees C    Labs notable for: Hgb 10.5 (down from 14.4 in March 2025), troponin 7  ECG: NSR, no ST changes    Prior imaging:  < from: CT Angio Heart and Coronaries w/ IV Cont (03.25.25 @ 09:30) >  IMPRESSION:    Concern proximal to mid LAD up to severe stenosis secondary to density   calcified plaque with limits luminal evaluation.    Distal RCA mixed calcified and noncalcified plaque causing subtotal   stenosis.   Patient TTE ( 4/21) revealing  Normal global left ventricular systolic function. LV Ejection Fraction by Otero's Method with a biplane EF of 63 %.   Mild concentric left ventricular hypertrophy. Spectral Doppler shows impaired relaxation pattern of left   ventricular myocardial filling (Grade I diastolic dysfunction). Normal right ventricular size and function.   Normal LA/RA size. Severe aortic valve stenosis (PV 3.99 m/s, PG 64 mmHg, MG 35 mmHg, JOEL by continuity 0.9 cm2, AVAi   0.49 cm2/m2, DI 0.23.) Mild aortic regurgitation. Trace mitral valve regurgitation.    Patient was noted to be anemia with evidence of melena, GI consulted  and now s/p EDG and colonoscopy ( 4/22) revealing normal esophagus, erythema in the stomach s/p biosy, 3 mm polyp on the transverse colon s/p polypectomy and diverticulosis  of the left side of the colon.  70 yo M, with PMHx of HLD, current smoker (2 cigarettes/day) presented to the ED for worsening chest pain. Yesterday, while walking on flat ground, he developed acute chest pain (described as pressure like, squeezing, associated with SOB) which resolved with rest. He denied any associated lightheadedness, dizziness, palpitations. Today, he was experiencing the same symptoms while going up and down a flight of stairs which prompted him to come to the ED. Symptoms always improved with rest, and never had these symptoms at rest. He has been having exertional chest pain for a while now but has been worsening recently. He has recently seen his cardiologist, Dr. Rodríguez and found to have an abnormal CCTA with concern of prox to mLAD severe stenosis; and FFR CT showing hemodynamically significant focal lesion in the mid LAD. Pt was scheduled for cath this week. Of note, he was started on ASA 81mg 3 weeks ago and stated that he has been having dark stools after starting ASA. Denies any BRBPR. Currently, he does not have any chest pain.     In the ED:  Vitals: · BP Systolic	149 mm Hg  · BP Diastolic	71 mm Hg  · Heart Rate	65 /min  · Respiration Rate (breaths/min)	20 /min  · Temp (F)	98 Degrees F  · Temp (C)	36.7 Degrees C  · Temp site	oral  · SpO2 (%)	99 %  · O2 Delivery/Oxygen Delivery Method	room air  · Temp at ED Arrival (C)	36.7 Degrees C    Labs notable for: Hgb 10.5 (down from 14.4 in March 2025), troponin 7  ECG: NSR, no ST changes    Prior imaging:  < from: CT Angio Heart and Coronaries w/ IV Cont (03.25.25 @ 09:30) >  IMPRESSION:    Concern proximal to mid LAD up to severe stenosis secondary to density   calcified plaque with limits luminal evaluation.    Distal RCA mixed calcified and noncalcified plaque causing subtotal   stenosis.   Patient TTE ( 4/21) revealing  Normal global left ventricular systolic function. LV Ejection Fraction by Otero's Method with a biplane EF of 63 %.   Mild concentric left ventricular hypertrophy. Spectral Doppler shows impaired relaxation pattern of left   ventricular myocardial filling (Grade I diastolic dysfunction). Normal right ventricular size and function.   Normal LA/RA size. Severe aortic valve stenosis (PV 3.99 m/s, PG 64 mmHg, MG 35 mmHg, JOEL by continuity 0.9 cm2, AVAi   0.49 cm2/m2, DI 0.23.) Mild aortic regurgitation. Trace mitral valve regurgitation.    Patient was noted to be anemia with evidence of melena, GI consulted  and now s/p EDG and colonoscopy ( 4/22) revealing normal esophagus, erythema in the stomach s/p biosy, 3 mm polyp on the transverse colon s/p polypectomy and diverticulosis  of the left side of the colon. Patient cleared for DAPT and transferred to [facility] for cardiac catheterization.    On 04/24/25 patient undernoted [facility] which revealed:   LM: Minor luminal irregularities     LAD:  70% mid stenosis (hemodynamically significant by iFR 0.80) PCI (SYNERGY XD 3.0X20MM) of 70% mLAD stenosis   D1: Mild disease    CX: Mild disease   OM1: Minor luminal irregularities     RCA: 95% distal stenosis.   RPDA: Mild disease    Patient with 95% distal RCA stenosis, plan for staged PCI in 4 weeks.     Patient was monitored overnight. On POD 1 patient remains HD stable with no complaints. Patient remains in SR with no arrhythmias noted on tele. EKG performed showed no acute ST changes. Examination of right radial artery  showed a C/DI site with no hematoma, erythema or bruit. Distal pulses are 2+ bilaterally. Renal function remains stable post cath. Patient will be discharged home on DAPT with ASA and Plavix.  Patient is being DC home in stable condition.     68 yo M, with PMHx of HLD, current smoker (2 cigarettes/day) presented to the ED for worsening chest pain. Yesterday, while walking on flat ground, he developed acute chest pain (described as pressure like, squeezing, associated with SOB) which resolved with rest. He denied any associated lightheadedness, dizziness, palpitations. Today, he was experiencing the same symptoms while going up and down a flight of stairs which prompted him to come to the ED. Symptoms always improved with rest, and never had these symptoms at rest. He has been having exertional chest pain for a while now but has been worsening recently. He has recently seen his cardiologist, Dr. Rodríguez and found to have an abnormal CCTA with concern of prox to mLAD severe stenosis; and FFR CT showing hemodynamically significant focal lesion in the mid LAD. Pt was scheduled for cath this week. Of note, he was started on ASA 81mg 3 weeks ago and stated that he has been having dark stools after starting ASA. Denies any BRBPR. Currently, he does not have any chest pain.     In the ED:  Vitals: · BP Systolic	149 mm Hg  · BP Diastolic	71 mm Hg  · Heart Rate	65 /min  · Respiration Rate (breaths/min)	20 /min  · Temp (F)	98 Degrees F  · Temp (C)	36.7 Degrees C  · Temp site	oral  · SpO2 (%)	99 %  · O2 Delivery/Oxygen Delivery Method	room air  · Temp at ED Arrival (C)	36.7 Degrees C    Labs notable for: Hgb 10.5 (down from 14.4 in March 2025), troponin 7  ECG: NSR, no ST changes    Prior imaging:  < from: CT Angio Heart and Coronaries w/ IV Cont (03.25.25 @ 09:30) >  IMPRESSION:    Concern proximal to mid LAD up to severe stenosis secondary to density   calcified plaque with limits luminal evaluation.    Distal RCA mixed calcified and noncalcified plaque causing subtotal   stenosis.   Patient TTE ( 4/21) revealing  Normal global left ventricular systolic function. LV Ejection Fraction by Otero's Method with a biplane EF of 63 %.   Mild concentric left ventricular hypertrophy. Spectral Doppler shows impaired relaxation pattern of left   ventricular myocardial filling (Grade I diastolic dysfunction). Normal right ventricular size and function.   Normal LA/RA size. Severe aortic valve stenosis (PV 3.99 m/s, PG 64 mmHg, MG 35 mmHg, JOEL by continuity 0.9 cm2, AVAi   0.49 cm2/m2, DI 0.23.) Mild aortic regurgitation. Trace mitral valve regurgitation.  Patient will follow up with Dr. Felton for outpatient TAVR workup.    Patient was noted to be anemia with evidence of melena, GI consulted  and now s/p EDG and colonoscopy ( 4/22) revealing normal esophagus, erythema in the stomach s/p biosy, 3 mm polyp on the transverse colon s/p polypectomy and diverticulosis  of the left side of the colon. Patient recommended by GI for pantoprazole 40mg BID.  Patient cleared for DAPT and transferred to [facility] for cardiac catheterization.  Patient will follow up with Dr. Prado for further outpatient follow up.      On 04/24/25 patient undernoted C which revealed:   LM: Minor luminal irregularities     LAD:  70% mid stenosis (hemodynamically significant by iFR 0.80) PCI (SYNERGY XD 3.0X20MM) of 70% mLAD stenosis   D1: Mild disease    CX: Mild disease   OM1: Minor luminal irregularities     RCA: 95% distal stenosis.   RPDA: Mild disease    Patient with 95% distal RCA stenosis, plan for staged PCI in 4 weeks.     Patient was monitored overnight. On POD 1 patient remains HD stable with no complaints. Patient remains in SR with no arrhythmias noted on tele. EKG performed showed no acute ST changes. Examination of right radial artery  showed a C/DI site with no hematoma, erythema or bruit. Distal pulses are 2+ bilaterally. Renal function remains stable post cath. Patient will be discharged home on DAPT with ASA and Plavix.  Patient is being DC home in stable condition with outpatient follow up with Dr. Rodríguez.

## 2025-04-24 NOTE — CHART NOTE - NSCHARTNOTEFT_GEN_A_CORE
PRE-OP DIAGNOSIS: Unstable angina     PROCEDURE:     [x] Coronary Angiogram   [x] [facility]     PRIMARY PHYSICIAN:  Dr. Rizo   FELLOW: Dr. Rojas      PROCEDURE DESCRIPTION:   Anesthesia: Local + Sedation     Access & Closure:   6-Fr Radial Artery => D-STAT     IV Contrast:  100mL      ESTIMATED BLOOD LOSS: <10cc  SPECIMENS REMOVED: None    Intervention: PCI of 70% mLAD stenosis   Implants: SYNERGY XD 3.0X20MM PITER        FINDINGS:   DOMINANCE: Right    LM: Minor luminal irregularities     LAD:  70% mid stenosis (hemodynamically significant by iFR 0.84). Mild distal disease  D1: Mild disease    CX: Mild disease   OM1: Minor luminal irregularities     RCA: 95% distal stenosis.   RPDA: Mild disease        LVEDP:  N/A  EF: 60%     POST-OP DIAGNOSIS:  2-Vessel Coronary Artery Disease (LAD, RCA)  Successful PCI of 70% stenosis (hemodynamically significant by iFR 0.84; CT-FFR 0.74) with a  SYNERGY XD 3.0X20MM PITER. 0% residual stenosis and excellent angiographic result post intervention.   95% distal RCA stenosis (staged PCI in 4 weeks).  Possible TAVR after RCA PCI     PLAN OF CARE:   [x] Post-procedure LVEDP-guided IV fluids: NS 75/hr x6hrs  [x] Medications: ASA/Plavix. high-intensity statin      DISPOSITION:  [x] D/C Home Today PRE-OP DIAGNOSIS: Unstable angina       PROCEDURE:     [x] Coronary Angiogram   [x] [facility]     PRIMARY PHYSICIAN:  Dr. Rizo   FELLOW: Dr. Rojas      PROCEDURE DESCRIPTION:   Anesthesia: Local + Sedation     Access & Closure:   6-Fr Radial Artery => D-STAT     IV Contrast:  100mL      ESTIMATED BLOOD LOSS: <10cc  SPECIMENS REMOVED: None    Intervention: PCI of 70% mLAD stenosis   Implants: SYNERGY XD 3.0X20MM PITER        FINDINGS:   DOMINANCE: Right    LM: Minor luminal irregularities     LAD:  70% mid stenosis (hemodynamically significant by iFR 0.80). Mild distal disease  D1: Mild disease    CX: Mild disease   OM1: Minor luminal irregularities     RCA: 95% distal stenosis.   RPDA: Mild disease        LVEDP:  N/A  EF: 60%     POST-OP DIAGNOSIS:  2-Vessel Coronary Artery Disease (LAD, RCA)  Successful PCI of 70% stenosis (hemodynamically significant by iFR 0.80; CT-FFR 0.74) with a  SYNERGY XD 3.0X20MM PITER. 0% residual stenosis and excellent angiographic result post intervention.   95% distal RCA stenosis (staged PCI in 4 weeks).    Possible TAVR after RCA PCI     PLAN OF CARE:   [x] Post-procedure LVEDP-guided IV fluids: NS 75/hr x6hrs  [x] Medications: ASA/Plavix. high-intensity statin      DISPOSITION:  [x] D/C Home Today

## 2025-04-24 NOTE — DISCHARGE NOTE PROVIDER - NSDCFUSCHEDAPPT_GEN_ALL_CORE_FT
Shahzad Valdez  [facility] PreAdmits  Scheduled Appointment: 04/25/2025    oLpez Rodríguez  [facility] PreAdmits  Scheduled Appointment: 05/27/2025    [facility] Phys[facility]  [address]  Scheduled Appointment: 05/27/2025    Lopez Rodríguez  [facility]  CARDIOLOGY [address]  Scheduled Appointment: 07/08/2025     Lopez Rodríguez  [facility] PreAdmits  Scheduled Appointment: 05/27/2025    [facility] Physician [facility]  [address]  Scheduled Appointment: 05/27/2025    Lopez Rodríguez  [facility]  CARDIOLOGY [address]  Scheduled Appointment: 07/08/2025     Chano Felton  [facility]  CARDIOLOGY [address]  Scheduled Appointment: 05/08/2025    Lopez Rodríguez  [facility] PreAdmits  Scheduled Appointment: 05/27/2025    [facility]  [address]  Scheduled Appointment: 05/27/2025    Lopez Rodríguez  [facility]  CARDIOLOGY [address]  Scheduled Appointment: 07/08/2025

## 2025-04-24 NOTE — PROGRESS NOTE ADULT - SUBJECTIVE AND OBJECTIVE BOX
Patient is a 69y old  Male who presents with a chief complaint of chest pain (23 Apr 2025 14:57)      overnight events: on RA . off pressor. going for cath today.            ROS: as in HPI; All other systems reviewed are negative        PHYSICAL EXAM  Vital Signs Last 24 Hrs  T(C): 36.3 (24 Apr 2025 07:01), Max: 36.3 (24 Apr 2025 07:01)  T(F): 97.3 (24 Apr 2025 07:01), Max: 97.3 (24 Apr 2025 07:01)  HR: 59 (24 Apr 2025 09:45) (58 - 73)  BP: 124/71 (24 Apr 2025 09:45) (110/56 - 124/71)  BP(mean): 92 (24 Apr 2025 09:45) (78 - 92)  RR: 20 (24 Apr 2025 09:45) (18 - 21)  SpO2: 97% (24 Apr 2025 09:45) (97% - 98%)    Parameters below as of 24 Apr 2025 09:45  Patient On (Oxygen Delivery Method): room air          CONSTITUTIONAL:    NAD    ENT:   Airway patent,     EYES:   Clear bilaterally,   pupils equal,   round and reactive to light.    CARDIAC:   Normal rate,   regular rhythm.    no edema    RESPIRATORY:   No wheezing   Normal chest expansion  Not tachypneic,    GASTROINTESTINAL:  Abdomen soft, non-tender,   No guarding,   Positive BS    MUSCULOSKELETAL:   Range of motion is not limited,    NEUROLOGICAL:   Alert and oriented   No motor deficits.    SKIN:   Skin normal color for race,   No evidence of rash.                I&O's Detail    23 Apr 2025 07:01  -  24 Apr 2025 07:00  --------------------------------------------------------  IN:    Oral Fluid: 420 mL  Total IN: 420 mL    OUT:  Total OUT: 0 mL    Total NET: 420 mL            LABS:                        10.2   3.47  )-----------( 161      ( 24 Apr 2025 05:43 )             30.1     24 Apr 2025 05:43    140    |  105    |  12     ----------------------------<  88     4.6     |  27     |  1.0      Ca    9.0        24 Apr 2025 05:43    TPro  5.9    /  Alb  4.0    /  TBili  1.0    /  DBili  x      /  AST  20     /  ALT  15     /  AlkPhos  122    24 Apr 2025 05:43  Amylase x     lipase x              CAPILLARY BLOOD GLUCOSE        PT/INR - ( 24 Apr 2025 05:43 )   PT: 11.30 sec;   INR: 0.96 ratio         PTT - ( 24 Apr 2025 05:43 )  PTT:33.0 sec  Urinalysis Basic - ( 24 Apr 2025 05:43 )    Color: x / Appearance: x / SG: x / pH: x  Gluc: 88 mg/dL / Ketone: x  / Bili: x / Urobili: x   Blood: x / Protein: x / Nitrite: x   Leuk Esterase: x / RBC: x / WBC x   Sq Epi: x / Non Sq Epi: x / Bacteria: x      Culture        MEDICATIONS  (STANDING):  aspirin enteric coated 81 milliGRAM(s) Oral daily  atorvastatin 80 milliGRAM(s) Oral at bedtime  chlorhexidine 2% Cloths 1 Application(s) Topical daily  enoxaparin Injectable 40 milliGRAM(s) SubCutaneous every 24 hours  influenza  Vaccine (HIGH DOSE) 0.5 milliLiter(s) IntraMuscular once  metoprolol succinate ER 25 milliGRAM(s) Oral daily  pantoprazole    Tablet 40 milliGRAM(s) Oral every 12 hours    MEDICATIONS  (PRN):

## 2025-04-24 NOTE — CHART NOTE - NSCHARTNOTEFT_GEN_A_CORE
INTERVENTIONAL CARDIOLOGY:                                               PREOPERATIVE DAY OF PROCEDURE EVALUATION:  I have personally seen and examined the patient.  I agree with the history and physical which I have reviewed and noted any changes below.       69-year-old male with typical anginal symptoms (pressure-like chest pain with exertion that resolves with rest), worsening in frequency/severity. Known severe LAD stenosis on recent CCTA (3/25/25) with hemodynamically significant focal mid-LAD lesion confirmed by FFR-CT. Initial troponin elevated at 7 with subsequent negative/flat troponins. ECG without acute ischemic changes. Currently hemodynamically stable and chest pain free. Clinical presentation highly consistent with unstable angina in the setting of known critical coronary stenosis.  Patient presents for [facility] for pre TAVR workup    < from: CT Angio Heart and Coronaries w/ IV Cont (03.25.25 @ 09:30) >  IMPRESSION:    Concern proximal to mid LAD up to severe stenosis secondary to density   calcified plaque with limits luminal evaluation.    Distal RCA mixed calcified and noncalcified plaque causing subtotal   stenosis.  Remaining nonobstructive disease as above.          < from: CT Fractional Flow Reserve (FFR-CT) Non-Invasive w/ Interpretation & Report (03.31.25 @ 09:11) >  FINDINGS:    Left Main: FFR CT extending to the distal segment is 0.99 with no   evidence of hemodynamically significant lesion.      LAD: FFR CT extending to the distal segment is 0.74 which is consistent   with a hemodynamically significant lesion.      LCX:FFR CT extending to the distal segment is 0.91 with no evidence of   hemodynamically significant lesion.      RCA:FFR CT extending to the distal segment is 0.85 with no evidence of   hemodynamically significant lesion.                        Pre cath note:  indication:  [ ] STEMI                [ ] NSTEMI                 [ ] Acute coronary syndrome                   [ ]Unstable Angina   [ ] high risk  [ ] intermediate risk  [ ] low risk                   [ x] Stable Angina     non-invasive testing:     ccta                     Date:   3/2025                  result: [ ] high risk  [ x] intermediate risk  [ ] low risk    Anti- Anginal medications:                    [ ] not used d/t                     [ ] used   ( ) BB     ( ) CCB      ( ) Nitrate   (  ) Ranexa          [ ] not used but strong indication not to use    Ejection Fraction                   [ ] <29            [ ] 30-39%   [ ] 40-49%     [x ]x>50%    CHF          [ ] active (within last 14 days on meds   [ ] Chronic (on meds but no exacerbation)  NYHA Functional Class:  (  ) Class I (no limitations)  (  ) Class II (slight limitation)  (  ) Class III (marked limitation)  (  ) Class IV (symptoms at rest)    COPD                   [ ] mild (on chronic bronchodilators)  [ ] moderate (on chronic steroid therapy)      [ ] severe (indication for home O2 or PACO2 >50)    Other risk factors:                     [ ] Previous MI                     [ ] CVA/ stroke                    [ ] carotid stent/ CEA                    [ ] PVD/PAD- (arterial aneurysm, non-palpable pulses, tortuous vessel with inability to insert catheter, infra-renal dissection, renal or subclavian artery stenosis)                    [ ] previous CABG                    [ ] Renal Failure:  on HD  (  ) yes  (  ) no                    [ ] Diabetic  (  ) Type 1  (  ) Type 2                                         (  ) Insulin dependent  (  ) non-insulin dependent                                         (  ) Metformin  (  ) Januvia  (  ) Glimepiride  (  ) Glipizide  (  ) Glyburide  (  ) Actos                                         (  ) GLP-1 receptor agonists (Ozempic, Wegovy, Zepbound, Mounjaro, Trulicity, Byetta, Victoza)                                         (  ) SGLT2 Inhibitors (Farxiga, Jardiance, Invokana)                                         (  ) Other                            10.2   3.47  )-----------( 161      ( 24 Apr 2025 05:43 )             30.1     04-24    140  |  105  |  12  ----------------------------<  88  4.6   |  27  |  1.0    Ca    9.0      24 Apr 2025 05:43    TPro  5.9[L]  /  Alb  4.0  /  TBili  1.0  /  DBili  x   /  AST  20  /  ALT  15  /  AlkPhos  122[H]  04-24      Bleeding Risk: 0.7%      Pre-cath Hydration: ( x )  cc IV bolus x 1 over 1 hr followed by:    (  ) NS @ 75cc/hr until procedure (up to 2 hrs) if EF> 50%                                                                                                                             (  ) NS @ 50cc/hr until procedure (up to 2 hrs) if EF< 50%                                        (  ) No precath hydration d/t      RIGHT RADIAL ARTERY EVALUATION:  MARK TEST: [] Negative          [x] Positive    EF:   Date:< from: TTE Echo Complete w/o Contrast w/ Doppler (04.21.25 @ 11:53) >      Summary:   1. Normal global left ventricular systolic function.   2. LV Ejection Fraction by Otero's Method with a biplane EF of 63 %.   3. Mild concentric left ventricular hypertrophy.   4. Spectral Doppler shows impaired relaxation pattern of left   ventricular myocardial filling (Grade I diastolic dysfunction).   5. Normalright ventricular size and function.   6. Normal left atrial size.   7. Normal right atrial size.   8. Severe aortic valve stenosis.   9. PV 3.99 m/s, PG 64 mmHg, MG 35 mmHg, JOEL by continuity 0.9 cm2, AVAi   0.49 cm2/m2, DI 0.23.  10. Mild aortic regurgitation.  11. Trace mitral valve regurgitation.  12. Normal pulmonary artery pressure.  13. There is no evidence of pericardial effusion.            EKG:   Date: < from: 12 Lead ECG (04.21.25 @ 07:52) >    Diagnosis Line Sinus bradycardia  Left anterior fascicular block  Minimal voltage criteria for LVH, may be normal variant ( [place] product )  Abnormal ECG    < end of copied text >           (Signed electronically by __________)  04-24-25 @ 11:04 INTERVENTIONAL CARDIOLOGY:                                               PREOPERATIVE DAY OF PROCEDURE EVALUATION:  I have personally seen and examined the patient.  I agree with the history and physical which I have reviewed and noted any changes below.       69-year-old male with typical anginal symptoms (pressure-like chest pain with exertion that resolves with rest), worsening in frequency/severity. Known severe LAD stenosis on recent CCTA (3/25/25) with hemodynamically significant focal mid-LAD lesion confirmed by FFR-CT. Initial troponin elevated at 7 with subsequent negative/flat troponins. ECG without acute ischemic changes. Currently hemodynamically stable and chest pain free. Clinical presentation highly consistent with unstable angina in the setting of known critical coronary stenosis.  Patient presents for [facility] for pre TAVR workup    < from: CT Angio Heart and Coronaries w/ IV Cont (03.25.25 @ 09:30) >  IMPRESSION:    Concern proximal to mid LAD up to severe stenosis secondary to density   calcified plaque with limits luminal evaluation.    Distal RCA mixed calcified and noncalcified plaque causing subtotal   stenosis.  Remaining nonobstructive disease as above.          < from: CT Fractional Flow Reserve (FFR-CT) Non-Invasive w/ Interpretation & Report (03.31.25 @ 09:11) >  FINDINGS:    Left Main: FFR CT extending to the distal segment is 0.99 with no   evidence of hemodynamically significant lesion.      LAD: FFR CT extending to the distal segment is 0.74 which is consistent   with a hemodynamically significant lesion.      LCX:FFR CT extending to the distal segment is 0.91 with no evidence of   hemodynamically significant lesion.      RCA:FFR CT extending to the distal segment is 0.85 with no evidence of   hemodynamically significant lesion.                        Pre cath note:  indication:  [ ] STEMI                [ ] NSTEMI                 [ ] Acute coronary syndrome                   [ ]Unstable Angina   [ ] high risk  [ ] intermediate risk  [ ] low risk                   [ x] Stable Angina     non-invasive testing:     ccta                     Date:   3/2025                  result: [ ] high risk  [ x] intermediate risk  [ ] low risk    Anti- Anginal medications:                    [ ] not used d/t      no second AA due to soft bp               [x ] used   (x ) BB     ( ) CCB      ( ) Nitrate   (  ) Ranexa          [ ] not used but strong indication not to use    Ejection Fraction                   [ ] <29            [ ] 30-39%   [ ] 40-49%     [x ]x>50%    CHF          [ ] active (within last 14 days on meds   [ ] Chronic (on meds but no exacerbation)  NYHA Functional Class:  (  ) Class I (no limitations)  (  ) Class II (slight limitation)  (  ) Class III (marked limitation)  (  ) Class IV (symptoms at rest)    COPD                   [ ] mild (on chronic bronchodilators)  [ ] moderate (on chronic steroid therapy)      [ ] severe (indication for home O2 or PACO2 >50)    Other risk factors:                     [ ] Previous MI                     [ ] CVA/ stroke                    [ ] carotid stent/ CEA                    [ ] PVD/PAD- (arterial aneurysm, non-palpable pulses, tortuous vessel with inability to insert catheter, infra-renal dissection, renal or subclavian artery stenosis)                    [ ] previous CABG                    [ ] Renal Failure:  on HD  (  ) yes  (  ) no                    [ ] Diabetic  (  ) Type 1  (  ) Type 2                                         (  ) Insulin dependent  (  ) non-insulin dependent                                         (  ) Metformin  (  ) Januvia  (  ) Glimepiride  (  ) Glipizide  (  ) Glyburide  (  ) Actos                                         (  ) GLP-1 receptor agonists (Ozempic, Wegovy, Zepbound, Mounjaro, Trulicity, Byetta, Victoza)                                         (  ) SGLT2 Inhibitors (Farxiga, Jardiance, Invokana)                                         (  ) Other                            10.2   3.47  )-----------( 161      ( 24 Apr 2025 05:43 )             30.1     04-24    140  |  105  |  12  ----------------------------<  88  4.6   |  27  |  1.0    Ca    9.0      24 Apr 2025 05:43    TPro  5.9[L]  /  Alb  4.0  /  TBili  1.0  /  DBili  x   /  AST  20  /  ALT  15  /  AlkPhos  122[H]  04-24      Bleeding Risk: 0.7%      Pre-cath Hydration: ( x )  cc IV bolus x 1 over 1 hr followed by:    (  ) NS @ 75cc/hr until procedure (up to 2 hrs) if EF> 50%                                                                                                                             (  ) NS @ 50cc/hr until procedure (up to 2 hrs) if EF< 50%                                        (  ) No precath hydration d/t      RIGHT RADIAL ARTERY EVALUATION:  MARK TEST: [] Negative          [x] Positive    EF:   Date:< from: TTE Echo Complete w/o Contrast w/ Doppler (04.21.25 @ 11:53) >      Summary:   1. Normal global left ventricular systolic function.   2. LV Ejection Fraction by Otero's Method with a biplane EF of 63 %.   3. Mild concentric left ventricular hypertrophy.   4. Spectral Doppler shows impaired relaxation pattern of left   ventricular myocardial filling (Grade I diastolic dysfunction).   5. Normalright ventricular size and function.   6. Normal left atrial size.   7. Normal right atrial size.   8. Severe aortic valve stenosis.   9. PV 3.99 m/s, PG 64 mmHg, MG 35 mmHg, JOEL by continuity 0.9 cm2, AVAi   0.49 cm2/m2, DI 0.23.  10. Mild aortic regurgitation.  11. Trace mitral valve regurgitation.  12. Normal pulmonary artery pressure.  13. There is no evidence of pericardial effusion.            EKG:   Date: < from: 12 Lead ECG (04.21.25 @ 07:52) >    Diagnosis Line Sinus bradycardia  Left anterior fascicular block  Minimal voltage criteria for LVH, may be normal variant ( Yaakov product )  Abnormal ECG             (Signed electronically by __________)  04-24-25 @ 11:04

## 2025-04-24 NOTE — DISCHARGE NOTE PROVIDER - NSDCMRMEDTOKEN_GEN_ALL_CORE_FT
aspirin 81 mg oral tablet: 1 tab(s) orally once a day  rosuvastatin 20 mg oral tablet: 1 tab(s) orally once a day   aspirin 81 mg oral delayed release tablet: 1 tab(s) orally once a day  clopidogrel 75 mg oral tablet: 1 tab(s) orally once a day  metoprolol succinate 25 mg oral tablet, extended release: 1 tab(s) orally once a day  pantoprazole 40 mg oral delayed release tablet: 1 tab(s) orally every 12 hours  rosuvastatin 20 mg oral tablet: 1 tab(s) orally once a day

## 2025-04-24 NOTE — DISCHARGE NOTE PROVIDER - NSDCCPTREATMENT_GEN_ALL_CORE_FT
PRINCIPAL PROCEDURE  Procedure: Left heart cardiac catheterization  Findings and Treatment: - Please take aspirin 81 mg daily and plavix, unless directed by your Cardiologist.  - Do not drive or operate heavy machinery for 24 hours.  - After 24 hours, you may shower and remove the dressing from the site.  - Avoid using affected arm for 24 hours.  - No heavy lifting (objects more than 5 lbs) for 1 week.  - Do not bathe or swim for 1 week.   - Do not rub or apply lotion, cream, or powder to the affected site. Leave it open to the air.   - Any sudden swelling, redness, fever, discharge, or severe pain, call your Cardiologist or call the Catheterization Lab at [phone].  - If there is bleeding from the puncture site, apply direct firm pressure on the site and call [phone].     PRINCIPAL PROCEDURE  Procedure: Left heart cardiac catheterization  Findings and Treatment: - Please take aspirin 81 mg daily and plavix 75mg daily, unless directed by your Cardiologist.  - Do not drive or operate heavy machinery for 24 hours.  - After 24 hours, you may shower and remove the dressing from the site.  - Avoid using affected arm for 24 hours.  - No heavy lifting (objects more than 5 lbs) for 1 week.  - Do not bathe or swim for 1 week.   - Do not rub or apply lotion, cream, or powder to the affected site. Leave it open to the air.   - Any sudden swelling, redness, fever, discharge, or severe pain, call your Cardiologist or call the Catheterization Lab at [phone].  - If there is bleeding from the puncture site, apply direct firm pressure on the site and call [phone].     PRINCIPAL PROCEDURE  Procedure: Left heart cardiac catheterization  Findings and Treatment: - Please take aspirin 81 mg daily and plavix 75mg daily, unless directed by your Cardiologist.  - Do not drive or operate heavy machinery for 24 hours.  - After 24 hours, you may shower and remove the dressing from the site.  - Avoid using affected arm for 24 hours.  - No heavy lifting (objects more than 5 lbs) for 1 week.  - Do not bathe or swim for 1 week.   - Do not rub or apply lotion, cream, or powder to the affected site. Leave it open to the air.   - Any sudden swelling, redness, fever, discharge, or severe pain, call your Cardiologist or call the Catheterization Lab at [phone].  - If there is bleeding from the puncture site, apply direct firm pressure on the site and call 911.      SECONDARY PROCEDURE  Procedure: EGD, with colonoscopy  Findings and Treatment:

## 2025-04-24 NOTE — DISCHARGE NOTE PROVIDER - CARE PROVIDER_API CALL
Lopez Rodríguez  Cardiovascular Disease  [address] [place] [address]  Phone: [phone]  Fax: [phone]  Follow Up Time: 2 weeks   Lopez Rodríguez  Cardiovascular Disease  [address] [place] [address]  Phone: [phone]  Fax: [phone]  Follow Up Time: 2 weeks    Chano Felton  Interventional Cardiology  [address] [place] [address], [address]  [place] [address]  Phone: [phone]  Fax: [phone]  Follow Up Time: 1 month    Abby Prado  Gastroenterology  [address] [place] [address]  Phone: [phone]  Fax: [phone]  Follow Up Time: 1 month

## 2025-04-24 NOTE — DISCHARGE NOTE PROVIDER - NSDCCPCAREPLAN_GEN_ALL_CORE_FT
PRINCIPAL DISCHARGE DIAGNOSIS  Diagnosis: Chest pain  Assessment and Plan of Treatment:       SECONDARY DISCHARGE DIAGNOSES  Diagnosis: Melena  Assessment and Plan of Treatment:      PRINCIPAL DISCHARGE DIAGNOSIS  Diagnosis: CAD (coronary artery disease)  Assessment and Plan of Treatment:       SECONDARY DISCHARGE DIAGNOSES  Diagnosis: Melena  Assessment and Plan of Treatment:

## 2025-04-24 NOTE — DISCHARGE NOTE PROVIDER - PROVIDER TOKENS
PROVIDER:[TOKEN:[[id number]],FOLLOWUP:[2 weeks]] PROVIDER:[TOKEN:[[id number]],FOLLOWUP:[2 weeks]],PROVIDER:[TOKEN:[[id number]],FOLLOWUP:[1 month]],PROVIDER:[TOKEN:[[id number]],FOLLOWUP:[1 month]]

## 2025-04-25 ENCOUNTER — TRANSCRIPTION ENCOUNTER (OUTPATIENT)
Age: 70
End: 2025-04-25

## 2025-04-25 VITALS
HEART RATE: 67 BPM | DIASTOLIC BLOOD PRESSURE: 56 MMHG | TEMPERATURE: 98 F | SYSTOLIC BLOOD PRESSURE: 107 MMHG | RESPIRATION RATE: 17 BRPM

## 2025-04-25 LAB
HCT VFR BLD CALC: 27.3 % — LOW (ref 42–52)
HGB BLD-MCNC: 9.4 G/DL — LOW (ref 14–18)
MCHC RBC-ENTMCNC: 29.4 PG — SIGNIFICANT CHANGE UP (ref 27–31)
MCHC RBC-ENTMCNC: 34.4 G/DL — SIGNIFICANT CHANGE UP (ref 32–37)
MCV RBC AUTO: 85.3 FL — SIGNIFICANT CHANGE UP (ref 80–94)
NRBC BLD AUTO-RTO: 0 /100 WBCS — SIGNIFICANT CHANGE UP (ref 0–0)
PLATELET # BLD AUTO: 158 K/UL — SIGNIFICANT CHANGE UP (ref 130–400)
PMV BLD: 10.3 FL — SIGNIFICANT CHANGE UP (ref 7.4–10.4)
RBC # BLD: 3.2 M/UL — LOW (ref 4.7–6.1)
RBC # FLD: 13.1 % — SIGNIFICANT CHANGE UP (ref 11.5–14.5)
TSH SERPL-MCNC: 2.51 UIU/ML — SIGNIFICANT CHANGE UP (ref 0.27–4.2)
WBC # BLD: 3.36 K/UL — LOW (ref 4.8–10.8)
WBC # FLD AUTO: 3.36 K/UL — LOW (ref 4.8–10.8)

## 2025-04-25 PROCEDURE — 93010 ELECTROCARDIOGRAM REPORT: CPT

## 2025-04-25 PROCEDURE — 99239 HOSP IP/OBS DSCHRG MGMT >30: CPT

## 2025-04-25 RX ORDER — ASPIRIN 325 MG
1 TABLET ORAL
Refills: 0 | DISCHARGE

## 2025-04-25 RX ORDER — ASPIRIN 325 MG
1 TABLET ORAL
Qty: 30 | Refills: 0
Start: 2025-04-25 | End: 2025-05-24

## 2025-04-25 RX ORDER — METOPROLOL SUCCINATE 50 MG/1
1 TABLET, EXTENDED RELEASE ORAL
Qty: 30 | Refills: 0
Start: 2025-04-25 | End: 2025-05-24

## 2025-04-25 RX ORDER — CLOPIDOGREL BISULFATE 75 MG/1
1 TABLET, FILM COATED ORAL
Qty: 30 | Refills: 0
Start: 2025-04-25 | End: 2025-05-24

## 2025-04-25 RX ADMIN — METOPROLOL SUCCINATE 25 MILLIGRAM(S): 50 TABLET, EXTENDED RELEASE ORAL at 05:40

## 2025-04-25 RX ADMIN — Medication 40 MILLIGRAM(S): at 05:40

## 2025-04-25 RX ADMIN — Medication 81 MILLIGRAM(S): at 12:03

## 2025-04-25 RX ADMIN — CLOPIDOGREL BISULFATE 75 MILLIGRAM(S): 75 TABLET, FILM COATED ORAL at 12:03

## 2025-04-25 NOTE — DISCHARGE NOTE NURSING/CASE MANAGEMENT/SOCIAL WORK - PATIENT PORTAL LINK FT
You can access the FollowMyHealth Patient Portal offered by [facility] by registering at the following website: [email or web address]. By joining [employer]’s FollowMyHealth portal, you will also be able to view your health information using other applications (apps) compatible with our system.

## 2025-04-25 NOTE — DISCHARGE NOTE NURSING/CASE MANAGEMENT/SOCIAL WORK - NSDCPEFALRISK_GEN_ALL_CORE
For information on Fall & Injury Prevention, visit: [email or web address] OR  [email or web address] OR  https://www.cdc.gov/steadi/patient.html

## 2025-04-25 NOTE — DISCHARGE NOTE NURSING/CASE MANAGEMENT/SOCIAL WORK - NSDCPEWEB_GEN_ALL_CORE
[facility] for Tobacco Control website --- [email or web address] website --- [email or web address]

## 2025-04-25 NOTE — DISCHARGE NOTE NURSING/CASE MANAGEMENT/SOCIAL WORK - FINANCIAL ASSISTANCE
[facility] provides services at a reduced cost to those who are determined to be eligible through [facility]’s financial assistance program. Information regarding [facility]’s financial assistance program can be found by going to [email or web address] or by calling [phone].

## 2025-04-25 NOTE — DISCHARGE NOTE NURSING/CASE MANAGEMENT/SOCIAL WORK - NSDCPEEMAIL_GEN_ALL_CORE
[facility] for Tobacco Control email [email or web address]

## 2025-04-28 PROBLEM — E78.00 PURE HYPERCHOLESTEROLEMIA, UNSPECIFIED: Chronic | Status: ACTIVE | Noted: 2025-04-20

## 2025-05-01 DIAGNOSIS — K63.5 POLYP OF COLON: ICD-10-CM

## 2025-05-01 DIAGNOSIS — E78.5 HYPERLIPIDEMIA, UNSPECIFIED: ICD-10-CM

## 2025-05-01 DIAGNOSIS — K57.31 DIVERTICULOSIS OF LARGE INTESTINE WITHOUT PERFORATION OR ABSCESS WITH BLEEDING: ICD-10-CM

## 2025-05-01 DIAGNOSIS — R00.1 BRADYCARDIA, UNSPECIFIED: ICD-10-CM

## 2025-05-01 DIAGNOSIS — F17.210 NICOTINE DEPENDENCE, CIGARETTES, UNCOMPLICATED: ICD-10-CM

## 2025-05-01 DIAGNOSIS — I44.4 LEFT ANTERIOR FASCICULAR BLOCK: ICD-10-CM

## 2025-05-01 DIAGNOSIS — I25.110 ATHEROSCLEROTIC HEART DISEASE OF NATIVE CORONARY ARTERY WITH UNSTABLE ANGINA PECTORIS: ICD-10-CM

## 2025-05-01 DIAGNOSIS — Z79.82 LONG TERM (CURRENT) USE OF ASPIRIN: ICD-10-CM

## 2025-05-01 DIAGNOSIS — D50.9 IRON DEFICIENCY ANEMIA, UNSPECIFIED: ICD-10-CM

## 2025-05-01 DIAGNOSIS — K29.71 GASTRITIS, UNSPECIFIED, WITH BLEEDING: ICD-10-CM

## 2025-05-01 DIAGNOSIS — I35.0 NONRHEUMATIC AORTIC (VALVE) STENOSIS: ICD-10-CM

## 2025-05-07 ENCOUNTER — APPOINTMENT (OUTPATIENT)
Dept: CV DIAGNOSITCS | Facility: HOSPITAL | Age: 70
End: 2025-05-07

## 2025-05-08 ENCOUNTER — APPOINTMENT (OUTPATIENT)
Dept: CARDIOLOGY | Facility: CLINIC | Age: 70
End: 2025-05-08
Payer: MEDICARE

## 2025-05-08 VITALS
BODY MASS INDEX: 25.41 KG/M2 | HEIGHT: 66.5 IN | DIASTOLIC BLOOD PRESSURE: 70 MMHG | SYSTOLIC BLOOD PRESSURE: 130 MMHG | RESPIRATION RATE: 16 BRPM | HEART RATE: 62 BPM | TEMPERATURE: 98.2 F | OXYGEN SATURATION: 98 % | WEIGHT: 160 LBS

## 2025-05-08 PROCEDURE — 99214 OFFICE O/P EST MOD 30 MIN: CPT

## 2025-05-09 ENCOUNTER — APPOINTMENT (OUTPATIENT)
Dept: CARDIOLOGY | Facility: CLINIC | Age: 70
End: 2025-05-09
Payer: MEDICARE

## 2025-05-09 VITALS
OXYGEN SATURATION: 99 % | BODY MASS INDEX: 25.41 KG/M2 | SYSTOLIC BLOOD PRESSURE: 120 MMHG | DIASTOLIC BLOOD PRESSURE: 68 MMHG | HEIGHT: 66.5 IN | HEART RATE: 61 BPM | WEIGHT: 160 LBS

## 2025-05-09 DIAGNOSIS — I35.0 NONRHEUMATIC AORTIC (VALVE) STENOSIS: ICD-10-CM

## 2025-05-09 DIAGNOSIS — R94.31 ABNORMAL ELECTROCARDIOGRAM [ECG] [EKG]: ICD-10-CM

## 2025-05-09 DIAGNOSIS — N52.9 MALE ERECTILE DYSFUNCTION, UNSPECIFIED: ICD-10-CM

## 2025-05-09 DIAGNOSIS — I45.2 BIFASCICULAR BLOCK: ICD-10-CM

## 2025-05-09 DIAGNOSIS — I25.10 ATHEROSCLEROTIC HEART DISEASE OF NATIVE CORONARY ARTERY W/OUT ANGINA PECTORIS: ICD-10-CM

## 2025-05-09 DIAGNOSIS — E78.5 HYPERLIPIDEMIA, UNSPECIFIED: ICD-10-CM

## 2025-05-09 LAB
ANION GAP SERPL CALC-SCNC: 6 MMOL/L
BUN SERPL-MCNC: 17 MG/DL
CALCIUM SERPL-MCNC: 9.5 MG/DL
CHLORIDE SERPL-SCNC: 106 MMOL/L
CO2 SERPL-SCNC: 28 MMOL/L
CREAT SERPL-MCNC: 1 MG/DL
EGFRCR SERPLBLD CKD-EPI 2021: 80 ML/MIN/1.73M2
GLUCOSE SERPL-MCNC: 90 MG/DL
POTASSIUM SERPL-SCNC: 4.4 MMOL/L
SODIUM SERPL-SCNC: 140 MMOL/L

## 2025-05-09 PROCEDURE — 93000 ELECTROCARDIOGRAM COMPLETE: CPT

## 2025-05-09 PROCEDURE — 99214 OFFICE O/P EST MOD 30 MIN: CPT

## 2025-05-09 PROCEDURE — G2211 COMPLEX E/M VISIT ADD ON: CPT

## 2025-05-09 RX ORDER — METOPROLOL SUCCINATE 25 MG/1
25 TABLET, EXTENDED RELEASE ORAL DAILY
Qty: 90 | Refills: 3 | Status: ACTIVE | COMMUNITY
Start: 1900-01-01 | End: 1900-01-01

## 2025-05-09 RX ORDER — CLOPIDOGREL BISULFATE 75 MG/1
75 TABLET, FILM COATED ORAL
Qty: 90 | Refills: 3 | Status: ACTIVE | COMMUNITY
Start: 1900-01-01 | End: 1900-01-01

## 2025-05-09 RX ORDER — PANTOPRAZOLE 40 MG/1
40 TABLET, DELAYED RELEASE ORAL
Qty: 90 | Refills: 3 | Status: ACTIVE | COMMUNITY
Start: 1900-01-01 | End: 1900-01-01

## 2025-05-12 LAB
ALBUMIN SERPL ELPH-MCNC: 4.2 G/DL
ALP BLD-CCNC: 113 U/L
ALT SERPL-CCNC: 21 U/L
ANION GAP SERPL CALC-SCNC: 11 MMOL/L
APTT BLD: 35.9 SEC
AST SERPL-CCNC: 25 U/L
BASOPHILS # BLD AUTO: 0.04 K/UL
BASOPHILS NFR BLD AUTO: 1.3 %
BILIRUB SERPL-MCNC: 1 MG/DL
BUN SERPL-MCNC: 16 MG/DL
CALCIUM SERPL-MCNC: 9.1 MG/DL
CHLORIDE SERPL-SCNC: 109 MMOL/L
CO2 SERPL-SCNC: 24 MMOL/L
CREAT SERPL-MCNC: 1.05 MG/DL
EGFRCR SERPLBLD CKD-EPI 2021: 76 ML/MIN/1.73M2
EOSINOPHIL # BLD AUTO: 0.12 K/UL
EOSINOPHIL NFR BLD AUTO: 3.8 %
GLUCOSE SERPL-MCNC: 109 MG/DL
HCT VFR BLD CALC: 31.2 %
HGB BLD-MCNC: 9.8 G/DL
IMM GRANULOCYTES NFR BLD AUTO: 0.6 %
INR PPP: 0.92 RATIO
LYMPHOCYTES # BLD AUTO: 0.59 K/UL
LYMPHOCYTES NFR BLD AUTO: 18.7 %
MAN DIFF?: NORMAL
MCHC RBC-ENTMCNC: 27.8 PG
MCHC RBC-ENTMCNC: 31.4 G/DL
MCV RBC AUTO: 88.6 FL
MONOCYTES # BLD AUTO: 0.34 K/UL
MONOCYTES NFR BLD AUTO: 10.8 %
NEUTROPHILS # BLD AUTO: 2.04 K/UL
NEUTROPHILS NFR BLD AUTO: 64.8 %
PLATELET # BLD AUTO: 192 K/UL
POTASSIUM SERPL-SCNC: 4.7 MMOL/L
PROT SERPL-MCNC: 6.5 G/DL
PT BLD: 10.9 SEC
RBC # BLD: 3.52 M/UL
RBC # FLD: 13.7 %
SODIUM SERPL-SCNC: 144 MMOL/L
WBC # FLD AUTO: 3.15 K/UL

## 2025-05-20 ENCOUNTER — NON-APPOINTMENT (OUTPATIENT)
Age: 70
End: 2025-05-20

## 2025-05-20 VITALS — DIASTOLIC BLOOD PRESSURE: 75 MMHG | SYSTOLIC BLOOD PRESSURE: 162 MMHG | OXYGEN SATURATION: 98 % | HEART RATE: 56 BPM

## 2025-05-20 NOTE — H&P CARDIOLOGY - HISTORY OF PRESENT ILLNESS
Patient is a 69y Male PMH of CAD s/p PCI, severe AS, HLD, RBBB, LAFB, BONIFACIO, carpal tunnel syndrome, ED who initially presented to cardiologist from PMD with c/o some chest tightness with exercise. 05/09/25-Patient underwent CCTA which showed possible severe mLAD disease and was scheduled for LHC. Pt developed CP and went to [facility] for evaluation. He was found to also have severe AS. He complained of melena and had endoscopy and colonoscopy for eval and cleared for DAPT. Pt underwent LHC and PCI to LAD now presents for staged PCI to RCA. Patient still with some SOB.     Pre cath note:  indication:  [ ] STEMI                [ ] NSTEMI                 [ ] Acute coronary syndrome                   [ ]Unstable Angina   [ ] high risk  [ ] intermediate risk  [ ] low risk                   [ ] Stable Angina     non-invasive testing:                          Date:                     result: [ ] high risk  [ ] intermediate risk  [ ] low risk    Anti- Anginal medications:                    [ ] not used d/t                     [x ] used   (x ) BB     ( ) CCB      ( ) Nitrate   (  ) Ranexa          [ ] not used but strong indication not to use    Ejection Fraction                   [ ] <29            [ ] 30-39%   [ ] 40-49%     [ ]>50%    CHF          [ ] active (within last 14 days on meds   [ ] Chronic (on meds but no exacerbation)  NYHA Functional Class:  (  ) Class I (no limitations)  (  ) Class II (slight limitation)  (  ) Class III (marked limitation)  (  ) Class IV (symptoms at rest)    COPD                   [ ] mild (on chronic bronchodilators)  [ ] moderate (on chronic steroid therapy)      [ ] severe (indication for home O2 or PACO2 >50)    Other risk factors:                     [ ] Previous MI                     [ ] CVA/ stroke                    [ ] carotid stent/ CEA                    [ ] PVD/PAD- (arterial aneurysm, non-palpable pulses, tortuous vessel with inability to insert catheter, infra-renal dissection, renal or subclavian artery stenosis)                    [ ] previous CABG                    [ ] Renal Failure:  on HD  (  ) yes  (  ) no                    [ ] Diabetic  (  ) Type 1  (  ) Type 2                                         (  ) Insulin dependent  (  ) non-insulin dependent                                         (  ) Metformin  (  ) Januvia  (  ) Glimepiride  (  ) Glipizide  (  ) Glyburide  (  ) Actos                                         (  ) GLP-1 receptor agonists (Ozempic, Mounjaro, Wegovy, trulicity, Byetta, Victoza)                                         (  ) SGLT2 Inhibitors (Farxiga, Jardiance, Invokana)                                         (  ) Other                Bleeding Risk: 2.4%    Pre-cath Hydration: (  )  cc IV bolus x 1 over 1 hr followed by:  (  ) NS @ 75cc/hr until procedure (up to 2 hrs) if EF> 50%                                                                                                                         (  ) NS @ 50cc/hr until procedure (up to 2 hrs) if EF< 50%                                      (  ) No precath hydration d/t    RIGHT RADIAL ARTERY EVALUATION:  MARK TEST: [] Negative          [] Positive    EF:   Date:    EKG:   Date: Patient is a 69y Male PMH of CAD s/p PCI, severe AS, HLD, RBBB, LAFB, BONIFACIO, carpal tunnel syndrome, ED who initially presented to cardiologist from PMD with c/o some chest tightness with exercise. 05/09/25-Patient underwent CCTA which showed possible severe mLAD disease and was scheduled for LHC. Pt developed CP and went to [facility] for evaluation. He was found to also have severe AS. He complained of melena and had endoscopy and colonoscopy for eval and cleared for DAPT. Pt underwent LHC and PCI to LAD(Synergy 3.0x20mm) on 4/24/25  now presents for staged PCI to RCA. Patient still with some SOB and dizziness.      LHC: 4/24/25  Intervention: PCI of 70% mLAD stenosis   Implants: SYNERGY XD 3.0X20MM PITER    FINDINGS:   DOMINANCE: Right    LM: Minor luminal irregularities     LAD:  70% mid stenosis (hemodynamically significant by iFR 0.80). Mild distal disease  D1: Mild disease    CX: Mild disease   OM1: Minor luminal irregularities     RCA: 95% distal stenosis.   RPDA: Mild disease    LVEDP:  N/A  EF: 60%     ACC: [id number] EXAM:  CT ANGIO HEART CORONARY IC   ORDERED BY: SERGEY JOHNSON     PROCEDURE DATE:  03/25/2025          INTERPRETATION:  CLINICAL INDICATION: CCTA CHEST PAIN NO ALLERGIES NO   RENAL PT IS PRE MED WILL BRING IN SCRIPT    COMPARISON: CT chest dated 7/9/2009.    TECHNIQUE: Unenhanced axial ECG-triggered CT was obtained through the   chest. CT angiography of the heart was then performed utilizing ECG-gated   imaging. In preparation for the examination, the patient received 0.8 mg   of sublingual nitroglycerine for coronary vasodilatation. Curved   multiplanar, 3-D MIP, and volume rendered images were later reconstructed   at an independent workstation.    CONTRAST: Omnipaque 350. 90 cc administered. 10 cc discarded.    INTERPRETATION:    Calcium Score:    Vessel              Calcium Score    =======================================================  LM:                    21  LAD:                 687  LCX:                 133  RCA:                 287  =======================================================  Total:                1128      CORONARY CT ANGIOGRAM:    There is a right dominant coronary arterial system. The left main   coronary artery quadrificates.    LEFT MAIN: Punctate calcified plaque causing less than 50% stenosis.    LEFT ANTERIOR DESCENDING:    Proximal: Densely calcified plaque limiting luminal analysis with likely   up to moderate stenosis. Severe stenosis cannot be completely excluded.  Mid: Densely calcified plaque limiting luminal analysis with concern for   severe stenosis.  Distal: Normal.    First diagonal: Punctate calcification causing less than than 50%   stenosis.  Second diagonal: Diminutive.    RAMUS INTERMEDIUS: 2 small adjacent ramus branches are patent.    LEFT CIRCUMFLEX:    Proximal: Calcific plaque causing mild stenosis proximal to the first OM   branch point.  Mid/Distal: Normal.    OM1: Diminutive.  OM 2: Multiple foci of calcified plaque causing up to mild stenosis.    RIGHT CORONARY ARTERY:    Proximal: Noncalcified plaquescausing up to mild stenosis.  Mid: No significant stenosis.  Distal: Mixed calcified and noncalcified plaque causing up to subtotal   stenosis.  Posterior descending: Small caliber, no appreciable stenosis.    CARDIAC MORPHOLOGY: The cardiac chambersare normal in size  There is no   pericardial effusion. No left heart mass or thrombus. Right heart is   under opacified.    AORTIC VALVE: Tricuspid. Severe sclerotic changes with significant   calcifications.    IMAGED AORTA: The thoracic aorta is normal in caliber.    IMAGED EXTRACARDIAC FINDINGS:  The chest is only partially imaged.  Bilateral calcified hilar and mediastinal lymph nodes reflecting old   granulomatous disease.  6 mm solid nodule in the left upper lobe on series 307 image 292.  Adjacent ill-defined cystic lesion measuring approximately 1.3 cm in long   axis with peripheral thickness.  Visualized lungs without mass or consolidation. Visualized large airways   are patent.  Degenerative changes of the spine.    IMPRESSION:    Concern proximal to mid LAD up to severe stenosis secondary to density   calcified plaque with limits luminal evaluation.    Distal RCA mixed calcified and noncalcified plaque causing subtotal   stenosis.  Remaining nonobstructive disease as above.    CAD-RADS 4A.      Pre cath note:  indication:  [ ] STEMI                [ ] NSTEMI                 [ ] Acute coronary syndrome                   [ ]Unstable Angina   [ ] high risk  [ ] intermediate risk  [ ] low risk                   [x ] Stable Angina/staged PCI     non-invasive testing:       CCTA                    Date:                     result: [ ] high risk  [ ] intermediate risk  [ ] low risk    Anti- Anginal medications:                    [ ] not used d/t                     [x ] used   (x ) BB     ( ) CCB      ( ) Nitrate   (  ) Ranexa          [ ] not used but strong indication not to use    Ejection Fraction                   [ ] <29            [ ] 30-39%   [ ] 40-49%     [x ]>50%    CHF          [ ] active (within last 14 days on meds   [ ] Chronic (on meds but no exacerbation)  NYHA Functional Class:  (  ) Class I (no limitations)  (  ) Class II (slight limitation)  (  ) Class III (marked limitation)  (  ) Class IV (symptoms at rest)    COPD                   [ ] mild (on chronic bronchodilators)  [ ] moderate (on chronic steroid therapy)      [ ] severe (indication for home O2 or PACO2 >50)    Other risk factors:                     [ ] Previous MI                     [ ] CVA/ stroke                    [ ] carotid stent/ CEA                    [ ] PVD/PAD- (arterial aneurysm, non-palpable pulses, tortuous vessel with inability to insert catheter, infra-renal dissection, renal or subclavian artery stenosis)                    [ ] previous CABG                    [ ] Renal Failure:  on HD  (  ) yes  (  ) no                    [ ] Diabetic  (  ) Type 1  (  ) Type 2                                         (  ) Insulin dependent  (  ) non-insulin dependent                                         (  ) Metformin  (  ) Januvia  (  ) Glimepiride  (  ) Glipizide  (  ) Glyburide  (  ) Actos                                         (  ) GLP-1 receptor agonists (Ozempic, Mounjaro, Wegovy, trulicity, Byetta, Victoza)                                         (  ) SGLT2 Inhibitors (Farxiga, Jardiance, Invokana)                                         (  ) Other    Bleeding Risk: 2.4%    Pre-cath Hydration: (  )  cc IV bolus x 1 over 1 hr followed by:  (  ) NS @ 75cc/hr until procedure (up to 2 hrs) if EF> 50%                                                                                                                         (  ) NS @ 50cc/hr until procedure (up to 2 hrs) if EF< 50%                                      (  ) No precath hydration d/t    RIGHT RADIAL ARTERY EVALUATION:  MARK TEST: [] Negative          [x] Positive    EF: 63%  Date: 4/2025    EKG: SR  Date: 4/25/25 Patient is a 69y Male PMH of CAD s/p PCI, severe AS, HLD, RBBB, LAFB, BONIFACIO, carpal tunnel syndrome, ED who initially presented to cardiologist from PMD with c/o some chest tightness with exercise. 05/09/25-Patient underwent CCTA which showed possible severe mLAD disease and was scheduled for LHC. Pt developed CP and went to [facility] for evaluation. He was found to also have severe AS. He complained of melena and had endoscopy and colonoscopy for eval and cleared for DAPT. Pt underwent LHC and PCI to LAD(Synergy 3.0x20mm) on 4/24/25  now presents for staged PCI to RCA. Patient still with some SOB and dizziness.      LHC: 4/24/25  Intervention: PCI of 70% mLAD stenosis   Implants: SYNERGY XD 3.0X20MM PITER    FINDINGS:   DOMINANCE: Right    LM: Minor luminal irregularities     LAD:  70% mid stenosis (hemodynamically significant by iFR 0.80). Mild distal disease  D1: Mild disease    CX: Mild disease   OM1: Minor luminal irregularities     RCA: 95% distal stenosis.   RPDA: Mild disease    LVEDP:  N/A  EF: 60%     ACC: [id number] EXAM:  CT ANGIO HEART CORONARY IC   ORDERED BY: SERGEY JOHNSON     PROCEDURE DATE:  03/25/2025          INTERPRETATION:  CLINICAL INDICATION: CCTA CHEST PAIN NO ALLERGIES NO   RENAL PT IS PRE MED WILL BRING IN SCRIPT    COMPARISON: CT chest dated 7/9/2009.    TECHNIQUE: Unenhanced axial ECG-triggered CT was obtained through the   chest. CT angiography of the heart was then performed utilizing ECG-gated   imaging. In preparation for the examination, the patient received 0.8 mg   of sublingual nitroglycerine for coronary vasodilatation. Curved   multiplanar, 3-D MIP, and volume rendered images were later reconstructed   at an independent workstation.    CONTRAST: Omnipaque 350. 90 cc administered. 10 cc discarded.    INTERPRETATION:    Calcium Score:    Vessel              Calcium Score    =======================================================  LM:                    21  LAD:                 687  LCX:                 133  RCA:                 287  =======================================================  Total:                1128      CORONARY CT ANGIOGRAM:    There is a right dominant coronary arterial system. The left main   coronary artery quadrificates.    LEFT MAIN: Punctate calcified plaque causing less than 50% stenosis.    LEFT ANTERIOR DESCENDING:    Proximal: Densely calcified plaque limiting luminal analysis with likely   up to moderate stenosis. Severe stenosis cannot be completely excluded.  Mid: Densely calcified plaque limiting luminal analysis with concern for   severe stenosis.  Distal: Normal.    First diagonal: Punctate calcification causing less than than 50%   stenosis.  Second diagonal: Diminutive.    RAMUS INTERMEDIUS: 2 small adjacent ramus branches are patent.    LEFT CIRCUMFLEX:    Proximal: Calcific plaque causing mild stenosis proximal to the first OM   branch point.  Mid/Distal: Normal.    OM1: Diminutive.  OM 2: Multiple foci of calcified plaque causing up to mild stenosis.    RIGHT CORONARY ARTERY:    Proximal: Noncalcified plaquescausing up to mild stenosis.  Mid: No significant stenosis.  Distal: Mixed calcified and noncalcified plaque causing up to subtotal   stenosis.  Posterior descending: Small caliber, no appreciable stenosis.    CARDIAC MORPHOLOGY: The cardiac chambersare normal in size  There is no   pericardial effusion. No left heart mass or thrombus. Right heart is   under opacified.    AORTIC VALVE: Tricuspid. Severe sclerotic changes with significant   calcifications.    IMAGED AORTA: The thoracic aorta is normal in caliber.    IMAGED EXTRACARDIAC FINDINGS:  The chest is only partially imaged.  Bilateral calcified hilar and mediastinal lymph nodes reflecting old   granulomatous disease.  6 mm solid nodule in the left upper lobe on series 307 image 292.  Adjacent ill-defined cystic lesion measuring approximately 1.3 cm in long   axis with peripheral thickness.  Visualized lungs without mass or consolidation. Visualized large airways   are patent.  Degenerative changes of the spine.    IMPRESSION:    Concern proximal to mid LAD up to severe stenosis secondary to density   calcified plaque with limits luminal evaluation.    Distal RCA mixed calcified and noncalcified plaque causing subtotal   stenosis.  Remaining nonobstructive disease as above.    CAD-RADS 4A.      Pre cath note:  indication:  [ ] STEMI                [ ] NSTEMI                 [ ] Acute coronary syndrome                   [ ]Unstable Angina   [ ] high risk  [ ] intermediate risk  [ ] low risk                   [x ] Stable Angina/staged PCI     non-invasive testing:       CCTA                    Date:                     result: [ ] high risk  [ ] intermediate risk  [ ] low risk    Anti- Anginal medications:                    [ ] not used d/t                     [x ] used   (x ) BB     ( ) CCB      ( ) Nitrate   (  ) Ranexa          [ ] not used but strong indication not to use    Ejection Fraction                   [ ] <29            [ ] 30-39%   [ ] 40-49%     [x ]>50%    CHF          [ ] active (within last 14 days on meds   [ ] Chronic (on meds but no exacerbation)  NYHA Functional Class:  (  ) Class I (no limitations)  (  ) Class II (slight limitation)  (  ) Class III (marked limitation)  (  ) Class IV (symptoms at rest)    COPD                   [ ] mild (on chronic bronchodilators)  [ ] moderate (on chronic steroid therapy)      [ ] severe (indication for home O2 or PACO2 >50)    Other risk factors:                     [ ] Previous MI                     [ ] CVA/ stroke                    [ ] carotid stent/ CEA                    [ ] PVD/PAD- (arterial aneurysm, non-palpable pulses, tortuous vessel with inability to insert catheter, infra-renal dissection, renal or subclavian artery stenosis)                    [ ] previous CABG                    [ ] Renal Failure:  on HD  (  ) yes  (  ) no                    [ ] Diabetic  (  ) Type 1  (  ) Type 2                                         (  ) Insulin dependent  (  ) non-insulin dependent                                         (  ) Metformin  (  ) Januvia  (  ) Glimepiride  (  ) Glipizide  (  ) Glyburide  (  ) Actos                                         (  ) GLP-1 receptor agonists (Ozempic, Mounjaro, Wegovy, trulicity, Byetta, Victoza)                                         (  ) SGLT2 Inhibitors (Farxiga, Jardiance, Invokana)                                         (  ) Other    Bleeding Risk: 2.4%    Pre-cath Hydration: ( x )  cc IV bolus x 1 over 1 hr d/t severe AS  (  ) NS @ 75cc/hr until procedure (up to 2 hrs) if EF> 50%                                                                                                                         (  ) NS @ 50cc/hr until procedure (up to 2 hrs) if EF< 50%                                      (  ) No precath hydration d/t    RIGHT RADIAL ARTERY EVALUATION:  MARK TEST: [] Negative          [x] Positive    EF: 63%  Date: 4/2025    EKG: SR  Date: 4/25/25 Patient is a 69y Male PMH of CAD s/p PCI, severe AS, HLD, RBBB, LAFB, BONIFACIO, carpal tunnel syndrome, ED who initially presented to cardiologist from PMD with c/o some chest tightness with exercise. 05/09/25-Patient underwent CCTA which showed possible severe mLAD disease and was scheduled for LHC. Pt developed CP and went to [facility] for evaluation. He was found to also have severe AS. He complained of melena and had endoscopy and colonoscopy for eval and cleared for DAPT. Pt underwent LHC and PCI to LAD(Synergy 3.0x20mm) on 4/24/25  now presents for staged PCI to RCA. Patient still with some SOB and dizziness.      LHC: 4/24/25  Intervention: PCI of 70% mLAD stenosis   Implants: SYNERGY XD 3.0X20MM PITER    FINDINGS:   DOMINANCE: Right    LM: Minor luminal irregularities     LAD:  70% mid stenosis (hemodynamically significant by iFR 0.80). Mild distal disease  D1: Mild disease    CX: Mild disease   OM1: Minor luminal irregularities     RCA: 95% distal stenosis.   RPDA: Mild disease    LVEDP:  N/A  EF: 60%     ACC: [id number] EXAM:  CT ANGIO HEART CORONARY IC   ORDERED BY: SERGEY JOHNSON     PROCEDURE DATE:  03/25/2025          INTERPRETATION:  CLINICAL INDICATION: CCTA CHEST PAIN NO ALLERGIES NO   RENAL PT IS PRE MED WILL BRING IN SCRIPT    COMPARISON: CT chest dated 7/9/2009.    TECHNIQUE: Unenhanced axial ECG-triggered CT was obtained through the   chest. CT angiography of the heart was then performed utilizing ECG-gated   imaging. In preparation for the examination, the patient received 0.8 mg   of sublingual nitroglycerine for coronary vasodilatation. Curved   multiplanar, 3-D MIP, and volume rendered images were later reconstructed   at an independent workstation.    CONTRAST: Omnipaque 350. 90 cc administered. 10 cc discarded.    INTERPRETATION:    Calcium Score:    Vessel              Calcium Score    =======================================================  LM:                    21  LAD:                 687  LCX:                 133  RCA:                 287  =======================================================  Total:                1128      CORONARY CT ANGIOGRAM:    There is a right dominant coronary arterial system. The left main   coronary artery quadrificates.    LEFT MAIN: Punctate calcified plaque causing less than 50% stenosis.    LEFT ANTERIOR DESCENDING:    Proximal: Densely calcified plaque limiting luminal analysis with likely   up to moderate stenosis. Severe stenosis cannot be completely excluded.  Mid: Densely calcified plaque limiting luminal analysis with concern for   severe stenosis.  Distal: Normal.    First diagonal: Punctate calcification causing less than than 50%   stenosis.  Second diagonal: Diminutive.    RAMUS INTERMEDIUS: 2 small adjacent ramus branches are patent.    LEFT CIRCUMFLEX:    Proximal: Calcific plaque causing mild stenosis proximal to the first OM   branch point.  Mid/Distal: Normal.    OM1: Diminutive.  OM 2: Multiple foci of calcified plaque causing up to mild stenosis.    RIGHT CORONARY ARTERY:    Proximal: Noncalcified plaquescausing up to mild stenosis.  Mid: No significant stenosis.  Distal: Mixed calcified and noncalcified plaque causing up to subtotal   stenosis.  Posterior descending: Small caliber, no appreciable stenosis.    CARDIAC MORPHOLOGY: The cardiac chambersare normal in size  There is no   pericardial effusion. No left heart mass or thrombus. Right heart is   under opacified.    AORTIC VALVE: Tricuspid. Severe sclerotic changes with significant   calcifications.    IMAGED AORTA: The thoracic aorta is normal in caliber.    IMAGED EXTRACARDIAC FINDINGS:  The chest is only partially imaged.  Bilateral calcified hilar and mediastinal lymph nodes reflecting old   granulomatous disease.  6 mm solid nodule in the left upper lobe on series 307 image 292.  Adjacent ill-defined cystic lesion measuring approximately 1.3 cm in long   axis with peripheral thickness.  Visualized lungs without mass or consolidation. Visualized large airways   are patent.  Degenerative changes of the spine.    IMPRESSION:    Concern proximal to mid LAD up to severe stenosis secondary to density   calcified plaque with limits luminal evaluation.    Distal RCA mixed calcified and noncalcified plaque causing subtotal   stenosis.  Remaining nonobstructive disease as above.    CAD-RADS 4A.      Pre cath note:  indication:  [ ] STEMI                [ ] NSTEMI                 [ ] Acute coronary syndrome                   [ ]Unstable Angina   [ ] high risk  [ ] intermediate risk  [ ] low risk                   [x ] Stable Angina/staged PCI     non-invasive testing:       CCTA                    Date:                     result: [ ] high risk  [ ] intermediate risk  [ ] low risk    Anti- Anginal medications:                    [ ] not used d/t                     [x ] used (only 1 d/t )  (x ) BB     ( ) CCB      ( ) Nitrate   (  ) Ranexa          [ ] not used but strong indication not to use    Ejection Fraction                   [ ] <29            [ ] 30-39%   [ ] 40-49%     [x ]>50%    CHF          [ ] active (within last 14 days on meds   [ ] Chronic (on meds but no exacerbation)  NYHA Functional Class:  (  ) Class I (no limitations)  (  ) Class II (slight limitation)  (  ) Class III (marked limitation)  (  ) Class IV (symptoms at rest)    COPD                   [ ] mild (on chronic bronchodilators)  [ ] moderate (on chronic steroid therapy)      [ ] severe (indication for home O2 or PACO2 >50)    Other risk factors:                     [ ] Previous MI                     [ ] CVA/ stroke                    [ ] carotid stent/ CEA                    [ ] PVD/PAD- (arterial aneurysm, non-palpable pulses, tortuous vessel with inability to insert catheter, infra-renal dissection, renal or subclavian artery stenosis)                    [ ] previous CABG                    [ ] Renal Failure:  on HD  (  ) yes  (  ) no                    [ ] Diabetic  (  ) Type 1  (  ) Type 2                                         (  ) Insulin dependent  (  ) non-insulin dependent                                         (  ) Metformin  (  ) Januvia  (  ) Glimepiride  (  ) Glipizide  (  ) Glyburide  (  ) Actos                                         (  ) GLP-1 receptor agonists (Ozempic, Mounjaro, Wegovy, trulicity, Byetta, Victoza)                                         (  ) SGLT2 Inhibitors (Farxiga, Jardiance, Invokana)                                         (  ) Other    Bleeding Risk: 2.4%    Pre-cath Hydration: ( x )  cc IV bolus x 1 over 1 hr d/t severe AS  (  ) NS @ 75cc/hr until procedure (up to 2 hrs) if EF> 50%                                                                                                                         (  ) NS @ 50cc/hr until procedure (up to 2 hrs) if EF< 50%                                      (  ) No precath hydration d/t    RIGHT RADIAL ARTERY EVALUATION:  MARK TEST: [] Negative          [x] Positive    EF: 63%  Date: 4/2025    EKG: SR  Date: 4/25/25

## 2025-05-20 NOTE — H&P CARDIOLOGY - NSICDXPASTSURGICALHX_GEN_ALL_CORE_FT
PAST SURGICAL HISTORY:  H/O bilateral inguinal hernia repair     H/O carpal tunnel repair     History of appendectomy     History of tonsillectomy

## 2025-05-20 NOTE — H&P CARDIOLOGY - NSICDXPASTMEDICALHX_GEN_ALL_CORE_FT
[facility] [place] [facility]  [address], [address] [place] [address]  W: [phone]  F: [phone]    Reason for Visit:   Benitez Whittington is a 50 y.o. female seen today in office for follow up of Left Breast Cancer.    Treatment History:   Initially pt had abnormal imaging 7/24 on LEFT with suspicious calcs  8/1/2024: LEFT Breast Needle Core Biopsies: PATH -  Site A, LEFT breast calcifications: infiltrating lobular carcinoma, low nuclear grade, few scattered glands extending over 1mm linear dimension. Benign fatty tissue. No breast tissue identified  Site B, distortion: infiltrating lobular carcinoma, diminutive fragment, 1mm in greatest linear dimension  [place]+(90%)/[place]+(40%)/HER-2-  Breast MRI Breast 8/24 with 5 cm mass no LAD  10/3/24 Nipple Delay PATH -  Right breast, back of nipple, biopsy: Benign fibrous tissue, negative for carcinoma  Left axillary sentinel node #1, sentinel lymph node biopsy: One lymph node, negative for metastatic carcinoma (0/1)  Left axillary sentinel node #2, sentinel lymph node biopsy: One lymph node, positive for metastatic carcinoma compatible with   breast primary (1/1). Metastatic deposit measures up to 20 mm. Focal extranodal extension present  Left axillary sentinel node #3, sentinel lymph node biopsy: One lymph node, positive for metastatic carcinoma compatible with   breast primary (1/1). Metastatic deposit measures up to 12 mm. Focal extranodal extension present  Left breast, back nipple, biopsy: Benign breast tissue, negative for carcinoma.   10/17/24 Bilateral Mastectomy: PATH  Right breast, mastectomy: Benign breast with fibrocystic changes   Left breast, mastectomy: 39 mm invasive lobular carcinoma with focus of pleomorphic lobular carcinoma, grade 2. Lobular carcinoma in situ (LCIS). Background breast parenchyma with fibrocystic changes. Margins negative, 2/3 LN+  Pathologic Stage: pT2 pN1a  MammaPrint High Risk   Genetic Testing negative 
Benitez Whittington is a 50 y.o. female    Chief Complaint   Patient presents with    Follow-up     Left Breast Cancer       1. Have you been to the ER, urgent care clinic since your last visit?  Hospitalized since your last visit?No    2. Have you seen or consulted any other health care providers outside of the [facility] since your last visit?  Include any pap smears or colon screening. No    
PAST MEDICAL HISTORY:  CAD (coronary artery disease)     CAD, multiple vessel     High cholesterol     Mild HTN     Severe aortic stenosis

## 2025-05-22 ENCOUNTER — TRANSCRIPTION ENCOUNTER (OUTPATIENT)
Age: 70
End: 2025-05-22

## 2025-05-22 ENCOUNTER — OUTPATIENT (OUTPATIENT)
Dept: OUTPATIENT SERVICES | Facility: HOSPITAL | Age: 70
LOS: 1 days | Discharge: HOME | End: 2025-05-22
Payer: MEDICARE

## 2025-05-22 VITALS
DIASTOLIC BLOOD PRESSURE: 54 MMHG | HEART RATE: 62 BPM | TEMPERATURE: 98 F | OXYGEN SATURATION: 97 % | SYSTOLIC BLOOD PRESSURE: 117 MMHG | RESPIRATION RATE: 17 BRPM

## 2025-05-22 DIAGNOSIS — R07.9 CHEST PAIN, UNSPECIFIED: ICD-10-CM

## 2025-05-22 DIAGNOSIS — Z98.890 OTHER SPECIFIED POSTPROCEDURAL STATES: Chronic | ICD-10-CM

## 2025-05-22 DIAGNOSIS — R94.31 ABNORMAL ELECTROCARDIOGRAM [ECG] [EKG]: ICD-10-CM

## 2025-05-22 DIAGNOSIS — Z90.89 ACQUIRED ABSENCE OF OTHER ORGANS: Chronic | ICD-10-CM

## 2025-05-22 LAB
ANION GAP SERPL CALC-SCNC: 10 MMOL/L — SIGNIFICANT CHANGE UP (ref 7–14)
ANION GAP SERPL CALC-SCNC: 8 MMOL/L — SIGNIFICANT CHANGE UP (ref 7–14)
BUN SERPL-MCNC: 12 MG/DL — SIGNIFICANT CHANGE UP (ref 10–20)
BUN SERPL-MCNC: 13 MG/DL — SIGNIFICANT CHANGE UP (ref 10–20)
CALCIUM SERPL-MCNC: 8.4 MG/DL — SIGNIFICANT CHANGE UP (ref 8.4–10.5)
CALCIUM SERPL-MCNC: 9.8 MG/DL — SIGNIFICANT CHANGE UP (ref 8.4–10.5)
CHLORIDE SERPL-SCNC: 105 MMOL/L — SIGNIFICANT CHANGE UP (ref 98–110)
CHLORIDE SERPL-SCNC: 106 MMOL/L — SIGNIFICANT CHANGE UP (ref 98–110)
CO2 SERPL-SCNC: 23 MMOL/L — SIGNIFICANT CHANGE UP (ref 17–32)
CO2 SERPL-SCNC: 26 MMOL/L — SIGNIFICANT CHANGE UP (ref 17–32)
CREAT SERPL-MCNC: 1 MG/DL — SIGNIFICANT CHANGE UP (ref 0.7–1.5)
CREAT SERPL-MCNC: 1.1 MG/DL — SIGNIFICANT CHANGE UP (ref 0.7–1.5)
EGFR: 72 ML/MIN/1.73M2 — SIGNIFICANT CHANGE UP
EGFR: 72 ML/MIN/1.73M2 — SIGNIFICANT CHANGE UP
EGFR: 80 ML/MIN/1.73M2 — SIGNIFICANT CHANGE UP
EGFR: 80 ML/MIN/1.73M2 — SIGNIFICANT CHANGE UP
GLUCOSE SERPL-MCNC: 81 MG/DL — SIGNIFICANT CHANGE UP (ref 70–99)
GLUCOSE SERPL-MCNC: 88 MG/DL — SIGNIFICANT CHANGE UP (ref 70–99)
HCT VFR BLD CALC: 29.7 % — LOW (ref 42–52)
HCT VFR BLD CALC: 33.5 % — LOW (ref 42–52)
HGB BLD-MCNC: 10.7 G/DL — LOW (ref 14–18)
HGB BLD-MCNC: 9.6 G/DL — LOW (ref 14–18)
MCHC RBC-ENTMCNC: 25.5 PG — LOW (ref 27–31)
MCHC RBC-ENTMCNC: 25.9 PG — LOW (ref 27–31)
MCHC RBC-ENTMCNC: 31.9 G/DL — LOW (ref 32–37)
MCHC RBC-ENTMCNC: 32.3 G/DL — SIGNIFICANT CHANGE UP (ref 32–37)
MCV RBC AUTO: 80 FL — SIGNIFICANT CHANGE UP (ref 80–94)
MCV RBC AUTO: 80.1 FL — SIGNIFICANT CHANGE UP (ref 80–94)
NRBC BLD AUTO-RTO: 0 /100 WBCS — SIGNIFICANT CHANGE UP (ref 0–0)
NRBC BLD AUTO-RTO: 0 /100 WBCS — SIGNIFICANT CHANGE UP (ref 0–0)
PLATELET # BLD AUTO: 156 K/UL — SIGNIFICANT CHANGE UP (ref 130–400)
PLATELET # BLD AUTO: 192 K/UL — SIGNIFICANT CHANGE UP (ref 130–400)
PMV BLD: 10.4 FL — SIGNIFICANT CHANGE UP (ref 7.4–10.4)
PMV BLD: 10.6 FL — HIGH (ref 7.4–10.4)
POTASSIUM SERPL-MCNC: 3.8 MMOL/L — SIGNIFICANT CHANGE UP (ref 3.5–5)
POTASSIUM SERPL-MCNC: 4.1 MMOL/L — SIGNIFICANT CHANGE UP (ref 3.5–5)
POTASSIUM SERPL-SCNC: 3.8 MMOL/L — SIGNIFICANT CHANGE UP (ref 3.5–5)
POTASSIUM SERPL-SCNC: 4.1 MMOL/L — SIGNIFICANT CHANGE UP (ref 3.5–5)
RBC # BLD: 3.71 M/UL — LOW (ref 4.7–6.1)
RBC # BLD: 4.19 M/UL — LOW (ref 4.7–6.1)
RBC # FLD: 13.7 % — SIGNIFICANT CHANGE UP (ref 11.5–14.5)
RBC # FLD: 13.8 % — SIGNIFICANT CHANGE UP (ref 11.5–14.5)
SODIUM SERPL-SCNC: 137 MMOL/L — SIGNIFICANT CHANGE UP (ref 135–146)
SODIUM SERPL-SCNC: 141 MMOL/L — SIGNIFICANT CHANGE UP (ref 135–146)
WBC # BLD: 3.02 K/UL — LOW (ref 4.8–10.8)
WBC # BLD: 3.78 K/UL — LOW (ref 4.8–10.8)
WBC # FLD AUTO: 3.02 K/UL — LOW (ref 4.8–10.8)
WBC # FLD AUTO: 3.78 K/UL — LOW (ref 4.8–10.8)

## 2025-05-22 PROCEDURE — 92928 PRQ TCAT PLMT NTRAC ST 1 LES: CPT | Mod: RC

## 2025-05-22 PROCEDURE — 36415 COLL VENOUS BLD VENIPUNCTURE: CPT

## 2025-05-22 PROCEDURE — 93458 L HRT ARTERY/VENTRICLE ANGIO: CPT | Mod: 26,XU

## 2025-05-22 PROCEDURE — C1887: CPT

## 2025-05-22 PROCEDURE — C9600: CPT | Mod: RC

## 2025-05-22 PROCEDURE — C1725: CPT

## 2025-05-22 PROCEDURE — C1874: CPT

## 2025-05-22 PROCEDURE — C1769: CPT

## 2025-05-22 PROCEDURE — C1894: CPT

## 2025-05-22 PROCEDURE — 85027 COMPLETE CBC AUTOMATED: CPT

## 2025-05-22 PROCEDURE — 80048 BASIC METABOLIC PNL TOTAL CA: CPT

## 2025-05-22 RX ORDER — CLOPIDOGREL BISULFATE 75 MG/1
1 TABLET, FILM COATED ORAL
Qty: 30 | Refills: 3
Start: 2025-05-22 | End: 2025-09-18

## 2025-05-22 RX ORDER — ASPIRIN 325 MG
1 TABLET ORAL
Qty: 30 | Refills: 3
Start: 2025-05-22 | End: 2025-09-18

## 2025-05-22 NOTE — ASU DISCHARGE PLAN (ADULT/PEDIATRIC) - FINANCIAL ASSISTANCE
[facility] provides services at a reduced cost to those who are determined to be eligible through [facility]’s financial assistance program. Information regarding [facility]’s financial assistance program can be found by going to [email or web address] or by calling [phone].

## 2025-05-22 NOTE — ASU PATIENT PROFILE, ADULT - NSICDXPASTMEDICALHX_GEN_ALL_CORE_FT
PAST MEDICAL HISTORY:  CAD (coronary artery disease)     CAD, multiple vessel     High cholesterol     Mild HTN     Severe aortic stenosis

## 2025-05-22 NOTE — ASU DISCHARGE PLAN (ADULT/PEDIATRIC) - ASU DC SPECIAL INSTRUCTIONSFT
Activity:  - Do not drive or operate heavy machinery for 24 hours.  - Limit your physical or any strenuous activity for 2 weeks after angioplasty and 48 hours for angiogram. Support the groin site with your hand when you sneeze or cough. No heavy lifting ( objects more then 10 pounds).  - For wrist access, avoid using affected arm for 24 hours after removal of dressing and avoid heavy lifting for 7 days.  Hygiene:  - After 24 hours, you may shower and remove the dressing from the site. Do not tub bathe for one week. Do not rub or apply lotion, cream, powder to the affected site. Leave it open to air.   Diet:   - You may resume your diet. Low Sodium. Low Fat, Low Cholesterol.  If Diabetic - Carbohydrate Consistent Diet.      - Drink extra fluid unless otherwise advised.   Special Instructions:  - Bruising or black and blue at the puncture site is possible.  - If there is bleeding from the puncture site (groin or wrist) apply direct firm pressure on the site and call 911.  - Any sudden swelling, redness, fever, discharge or severe pain, call your physician or call the cath lab.   - If you notice any scab formation in the area avoid touching the site and allow it to heal.  - Numbness or "pins and needle" sensation in the affected arm, hand, leg or if the affected site become cool to touch or pale that persist for extended period of time call your physician immediately to be checked.  - If you developed chest pain, not relieved by your usual routine medication, fainting, lethargy, weakness, report to the nearest emergency room.   - Inform your Dentist or Surgeon if you are taking Aspirin or any antiplatelet medications. Report any bleeding in your urine or stool.   Medications:  - Soreness or tenderness at the site is possible it will diminish over time. You may take Tylenol every 4-6 hours as needed. Nothing stronger is needed.  - If you are diabetic and taking medication containing Metformin, do not take them for 48 hours after the procedure.     Any questions call Cardiac Cath Lab at [phone] or [phone], Monday - Friday from 7 - 9 pm.

## 2025-05-22 NOTE — CHART NOTE - NSCHARTNOTEFT_GEN_A_CORE
PRE-OP DIAGNOSIS:  stable angina      PROCEDURE:     [x] Coronary Angiogram     [x] LHC     [] LVG     [] RHC     [x] Intervention (see below)         PHYSICIAN:  Dr Rizo    ASSISTANT:  Lebron KRUSE       PROCEDURE DESCRIPTION:     Consent:      [x] Patient     [] Family Member     []  Used        Anesthesia:     [] General     [x] Sedation     [x] Local        Access & Closure:     [x] 6Fr Radial Artery--Dstat    [] Fr Femoral Artery     [] Fr Femoral Vein     [] Fr Brachial Vein       IV Contrast: 90 mL        Intervention:       Implants:        FINDINGS:     Coronary Dominance:       LM:     LAD:     CX:     Ramus:     RCA:     LIMA:     SVG:        LVEDP: mmHg     EF: %        ESTIMATED BLOOD LOSS: < 10 mL        CONDITION:     [] Good     [] Fair     [] Critical        SPECIMEN REMOVED: N/A       POST-OP DIAGNOSIS:      [] Normal Coronary Angiogram     [] Mild Coronary Artery Disease (< 50% stenosis)     [] __ Vessel Coronary Artery Disease        PLAN OF CARE:     [] D/C Home Today     [] Return to In-patient bed     [] Admit for observation     [] Return for Staged Procedure     [] CT Surgery Consult     [] Medications:     [] IV Fluids: PRE-OP DIAGNOSIS:  staged PCI       PROCEDURE:     [x] Coronary Angiogram     [x] LHC     [] LVG     [] RHC     [x] Intervention (see below)         PHYSICIAN:  Dr Rizo    ASSISTANT:  Lebron KRUSE       PROCEDURE DESCRIPTION:     Consent:      [x] Patient     [] Family Member     []  Used        Anesthesia:     [] General     [x] Sedation     [x] Local        Access & Closure:     [x] 6Fr Radial Artery--Dstat    [] Fr Femoral Artery     [] Fr Femoral Vein     [] Fr Brachial Vein       IV Contrast: 90 mL        Intervention: Successful PCI of dRCA 99% stenosis with a mm PITER. 0% residual      Implants: SYNERGY XD 3.0X16MM       FINDINGS:     Coronary Dominance: right       LM: The segment is medium sized. Angiography shows minor irregularities.    LAD: The segment is medium sized. Angiography shows minor irregularities. Proximal left anterior descending: Angiography shows minor irregularities. Mid left anterior descending: Angiography shows minor irregularities. The previously placed stent is a drug-eluting stent and is patent. Existing stent is patent. There is no in-stent thrombosis. Distal left anterior descending: Angiography shows minor irregularities. First diagonal: The segment is medium sized. Angiography shows minor irregularities    CX: he segment is medium sized. Angiography shows mild atherosclerosis. Proximal circumflex: Angiography shows minor irregularities. Distal circumflex: Angiography shows minor irregularities. First obtuse marginal: The segment is medium sized. Angiography shows minor irregularities.         RCA: the segment is large, dominant. Proximal right coronary artery: Angiography shows minor irregularities. Mid right coronary artery: Angiography shows minor irregularities. Distal right coronary artery: Angiography shows severe atherosclerosis. There is a 99 % stenosis. Right posterior descending artery: The segment is medium sized. Angiography shows minor irregularities.            LVEDP: mmHg     EF: %        ESTIMATED BLOOD LOSS: < 10 mL        CONDITION:     [x] Good     [] Fair     [] Critical        SPECIMEN REMOVED: N/A       POST-OP DIAGNOSIS:      [] Normal Coronary Angiogram     [] Mild Coronary Artery Disease (< 50% stenosis)     [x] __ 1 Vessel Coronary Artery Disease        PLAN OF CARE:     [x] D/C Home Today     [] Return to In-patient bed     [] Admit for observation     [] Return for Staged Procedure     [] CT Surgery Consult     [x] Medications: Dual anti-platelet therapy,BB, statin    [x] IV Fluids: NS 100cc x 6 hours. PRE-OP DIAGNOSIS:  staged PCI   AUC 7    PROCEDURE:     [x] Coronary Angiogram     [x] LHC     [] LVG     [] RHC     [x] Intervention (see below)         PHYSICIAN:  Dr Rizo    ASSISTANT:  Lebron KRUSE       PROCEDURE DESCRIPTION:     Consent:      [x] Patient     [] Family Member     []  Used        Anesthesia:     [] General     [x] Sedation     [x] Local        Access & Closure:     [x] 6Fr Radial Artery--Dstat    [] Fr Femoral Artery     [] Fr Femoral Vein     [] Fr Brachial Vein       IV Contrast: 90 mL        Intervention: Successful PCI of dRCA 99% stenosis with a mm PITER. 0% residual      Implants: SYNERGY XD 3.0X16MM       FINDINGS:     Coronary Dominance: right       LM: The segment is medium sized. Angiography shows minor irregularities.    LAD: The segment is medium sized. Angiography shows minor irregularities. Proximal left anterior descending: Angiography shows minor irregularities. Mid left anterior descending: Angiography shows minor irregularities. The previously placed stent is a drug-eluting stent and is patent. Existing stent is patent. There is no in-stent thrombosis. Distal left anterior descending: Angiography shows minor irregularities. First diagonal: The segment is medium sized. Angiography shows minor irregularities    CX: he segment is medium sized. Angiography shows mild atherosclerosis. Proximal circumflex: Angiography shows minor irregularities. Distal circumflex: Angiography shows minor irregularities. First obtuse marginal: The segment is medium sized. Angiography shows minor irregularities.         RCA: the segment is large, dominant. Proximal right coronary artery: Angiography shows minor irregularities. Mid right coronary artery: Angiography shows minor irregularities. Distal right coronary artery: Angiography shows severe atherosclerosis. There is a 99 % stenosis. Right posterior descending artery: The segment is medium sized. Angiography shows minor irregularities.            LVEDP: mmHg     EF: %        ESTIMATED BLOOD LOSS: < 10 mL        CONDITION:     [x] Good     [] Fair     [] Critical        SPECIMEN REMOVED: N/A       POST-OP DIAGNOSIS:      [] Normal Coronary Angiogram     [] Mild Coronary Artery Disease (< 50% stenosis)     [x] __ 1 Vessel Coronary Artery Disease        PLAN OF CARE:     [x] D/C Home Today     [] Return to In-patient bed     [] Admit for observation     [] Return for Staged Procedure     [] CT Surgery Consult     [x] Medications: Dual anti-platelet therapy,BB, statin    [x] IV Fluids: NS 100cc x 6 hours. PRE-OP DIAGNOSIS:  staged PCI   AUC 7    PROCEDURE:     [x] Coronary Angiogram     [x] LHC     [] LVG     [] RHC     [x] Intervention (see below)         PHYSICIAN:  Dr Rizo    ASSISTANT:  Lebron KRUSE       PROCEDURE DESCRIPTION:     Consent:      [x] Patient     [] Family Member     []  Used        Anesthesia:     [] General     [x] Sedation     [x] Local        Access & Closure:     [x] 6Fr Radial Artery--Dstat    [] Fr Femoral Artery     [] Fr Femoral Vein     [] Fr Brachial Vein       IV Contrast: 90 mL        Intervention: Successful PCI of dRCA 99% stenosis with a mm PITER. 0% residual      Implants: SYNERGY XD 3.0X16MM       FINDINGS:     Coronary Dominance: right       LM: The segment is medium sized. Angiography shows minor irregularities.    LAD: The segment is medium sized. Angiography shows minor irregularities. Proximal left anterior descending: Angiography shows minor irregularities. Mid left anterior descending: Angiography shows minor irregularities. The previously placed stent is a drug-eluting stent and is patent. Existing stent is patent. There is no in-stent thrombosis. Distal left anterior descending: Angiography shows minor irregularities. First diagonal: The segment is medium sized. Angiography shows minor irregularities    CX: he segment is medium sized. Angiography shows mild atherosclerosis. Proximal circumflex: Angiography shows minor irregularities. Distal circumflex: Angiography shows minor irregularities. First obtuse marginal: The segment is medium sized. Angiography shows minor irregularities.         RCA: the segment is large, dominant. Proximal right coronary artery: Angiography shows minor irregularities. Mid right coronary artery: Angiography shows minor irregularities. Distal right coronary artery: Angiography shows severe atherosclerosis. There is a 99 % stenosis. Right posterior descending artery: The segment is medium sized. Angiography shows minor irregularities.            LVEDP: 16 mmHg   LV-AO gradient 35 mm Hg    EF: %  - not done       ESTIMATED BLOOD LOSS: < 10 mL        CONDITION:     [x] Good     [] Fair     [] Critical        SPECIMEN REMOVED: N/A       POST-OP DIAGNOSIS:      [] Normal Coronary Angiogram     [] Mild Coronary Artery Disease (< 50% stenosis)     [x] __ 1 Vessel Coronary Artery Disease        PLAN OF CARE:     [x] D/C Home Today     [] Return to In-patient bed     [] Admit for observation     [] Return for Staged Procedure     [] CT Surgery Consult     [x] Medications: Dual anti-platelet therapy,BB, statin    [x] IV Fluids: NS 100cc x 6 hours.

## 2025-05-22 NOTE — ASU DISCHARGE PLAN (ADULT/PEDIATRIC) - CARE PROVIDER_API CALL
Nurse from [facility] called to have an order faxed to [facility] in [place] as patient is in need of an AFO Orthotic. Patient has a left foot drop. Please advise, thank you!     Kushal   [address] [place] [address]   Phone: [phone]   Fax: [phone]     Phil Rizo Y  Interventional Cardiology  [address] [place] [address], [address]  [place] [address]  Phone: [phone]  Fax: [phone]  Follow Up Time: 2 weeks

## 2025-05-22 NOTE — PROGRESS NOTE ADULT - SUBJECTIVE AND OBJECTIVE BOX
Cardiology Follow up s/sp PCI     GABBI CARDOSO   69y Male  PAST MEDICAL & SURGICAL HISTORY:  High cholesterol  CAD (coronary artery disease)  CAD, multiple vessel  Severe aortic stenosis  Mild HTN  History of appendectomy  History of tonsillectomy  H/O bilateral inguinal hernia repair  H/O carpal tunnel repair           HPI:  Patient is a 69y Male PMH of CAD s/p PCI, severe AS, HLD, RBBB, LAFB, BONIFACIO, carpal tunnel syndrome, ED who initially presented to cardiologist from PMD with c/o some chest tightness with exercise. 25-Patient underwent CCTA which showed possible severe mLAD disease and was scheduled for LHC. Pt developed CP and went to [facility] for evaluation. He was found to also have severe AS. He complained of melena and had endoscopy and colonoscopy for eval and cleared for DAPT. Pt underwent LHC and PCI to LAD(Synergy 3.0x20mm) on 25  now presents for staged PCI to RCA. Patient still with some SOB and dizziness.      LHC: 25  Intervention: PCI of 70% mLAD stenosis   Implants: SYNERGY XD 3.0X20MM PITER    FINDINGS:   DOMINANCE: Right    LM: Minor luminal irregularities     LAD:  70% mid stenosis (hemodynamically significant by iFR 0.80). Mild distal disease  D1: Mild disease    CX: Mild disease   OM1: Minor luminal irregularities     RCA: 95% distal stenosis.   RPDA: Mild disease    LVEDP:  N/A  EF: 60%     ACC: [id number] EXAM:  CT ANGIO HEART CORONARY IC   ORDERED BY: SERGEY JOHNSON     PROCEDURE DATE:  2025          INTERPRETATION:  CLINICAL INDICATION: CCTA CHEST PAIN NO ALLERGIES NO   RENAL PT IS PRE MED WILL BRING IN SCRIPT    COMPARISON: CT chest dated 2009.    TECHNIQUE: Unenhanced axial ECG-triggered CT was obtained through the   chest. CT angiography of the heart was then performed utilizing ECG-gated   imaging. In preparation for the examination, the patient received 0.8 mg   of sublingual nitroglycerine for coronary vasodilatation. Curved   multiplanar, 3-D MIP, and volume rendered images were later reconstructed   at an independent workstation.    CONTRAST: Omnipaque 350. 90 cc administered. 10 cc discarded.    INTERPRETATION:    Calcium Score:    Vessel              Calcium Score    =======================================================  LM:                    21  LAD:                 687  LCX:                 133  RCA:                 287  =======================================================  Total:                1128      CORONARY CT ANGIOGRAM:    There is a right dominant coronary arterial system. The left main   coronary artery quadrificates.    LEFT MAIN: Punctate calcified plaque causing less than 50% stenosis.    LEFT ANTERIOR DESCENDING:    Proximal: Densely calcified plaque limiting luminal analysis with likely   up to moderate stenosis. Severe stenosis cannot be completely excluded.  Mid: Densely calcified plaque limiting luminal analysis with concern for   severe stenosis.  Distal: Normal.    First diagonal: Punctate calcification causing less than than 50%   stenosis.  Second diagonal: Diminutive.    RAMUS INTERMEDIUS: 2 small adjacent ramus branches are patent.    LEFT CIRCUMFLEX:    Proximal: Calcific plaque causing mild stenosis proximal to the first OM   branch point.  Mid/Distal: Normal.    OM1: Diminutive.  OM 2: Multiple foci of calcified plaque causing up to mild stenosis.    RIGHT CORONARY ARTERY:    Proximal: Noncalcified plaquescausing up to mild stenosis.  Mid: No significant stenosis.  Distal: Mixed calcified and noncalcified plaque causing up to subtotal   stenosis.  Posterior descending: Small caliber, no appreciable stenosis.    CARDIAC MORPHOLOGY: The cardiac chambersare normal in size  There is no   pericardial effusion. No left heart mass or thrombus. Right heart is   under opacified.    AORTIC VALVE: Tricuspid. Severe sclerotic changes with significant   calcifications.    IMAGED AORTA: The thoracic aorta is normal in caliber.    IMAGED EXTRACARDIAC FINDINGS:  The chest is only partially imaged.  Bilateral calcified hilar and mediastinal lymph nodes reflecting old   granulomatous disease.  6 mm solid nodule in the left upper lobe on series 307 image 292.  Adjacent ill-defined cystic lesion measuring approximately 1.3 cm in long   axis with peripheral thickness.  Visualized lungs without mass or consolidation. Visualized large airways   are patent.  Degenerative changes of the spine.    IMPRESSION:    Concern proximal to mid LAD up to severe stenosis secondary to density   calcified plaque with limits luminal evaluation.    Distal RCA mixed calcified and noncalcified plaque causing subtotal   stenosis.  Remaining nonobstructive disease as above.    CAD-RADS 4A.      Pre cath note:  indication:  [ ] STEMI                [ ] NSTEMI                 [ ] Acute coronary syndrome                   [ ]Unstable Angina   [ ] high risk  [ ] intermediate risk  [ ] low risk                   [x ] Stable Angina/staged PCI     non-invasive testing:       CCTA                    Date:                     result: [ ] high risk  [ ] intermediate risk  [ ] low risk    Anti- Anginal medications:                    [ ] not used d/t                     [x ] used (only 1 d/t )  (x ) BB     ( ) CCB      ( ) Nitrate   (  ) Ranexa          [ ] not used but strong indication not to use    Ejection Fraction                   [ ] <29            [ ] 30-39%   [ ] 40-49%     [x ]>50%    CHF          [ ] active (within last 14 days on meds   [ ] Chronic (on meds but no exacerbation)  NYHA Functional Class:  (  ) Class I (no limitations)  (  ) Class II (slight limitation)  (  ) Class III (marked limitation)  (  ) Class IV (symptoms at rest)    COPD                   [ ] mild (on chronic bronchodilators)  [ ] moderate (on chronic steroid therapy)      [ ] severe (indication for home O2 or PACO2 >50)    Other risk factors:                     [ ] Previous MI                     [ ] CVA/ stroke                    [ ] carotid stent/ CEA                    [ ] PVD/PAD- (arterial aneurysm, non-palpable pulses, tortuous vessel with inability to insert catheter, infra-renal dissection, renal or subclavian artery stenosis)                    [ ] previous CABG                    [ ] Renal Failure:  on HD  (  ) yes  (  ) no                    [ ] Diabetic  (  ) Type 1  (  ) Type 2                                         (  ) Insulin dependent  (  ) non-insulin dependent                                         (  ) Metformin  (  ) Januvia  (  ) Glimepiride  (  ) Glipizide  (  ) Glyburide  (  ) Actos                                         (  ) GLP-1 receptor agonists (Ozempic, Mounjaro, Wegovy, trulicity, Byetta, Victoza)                                         (  ) SGLT2 Inhibitors (Farxiga, Jardiance, Invokana)                                         (  ) Other    Bleeding Risk: 2.4%    Pre-cath Hydration: ( x )  cc IV bolus x 1 over 1 hr d/t severe AS  (  ) NS @ 75cc/hr until procedure (up to 2 hrs) if EF> 50%                                                                                                                         (  ) NS @ 50cc/hr until procedure (up to 2 hrs) if EF< 50%                                      (  ) No precath hydration d/t    RIGHT RADIAL ARTERY EVALUATION:  MARK TEST: [] Negative          [x] Positive    EF: 63%  Date: 2025    EKG: SR  Date: 25 (20 May 2025 12:56)    Allergies    No Known Allergies    Intolerances      Patient seen and examined at bedside.   Patient without complaints.   Denies CP, SOB, palpitations, or dizziness  No events on telemetry   Vital Signs Last 24 Hrs  T(C): 36.6 (22 May 2025 09:00), Max: 37.1 (22 May 2025 08:52)  T(F): 97.8 (22 May 2025 09:00), Max: 98.7 (22 May 2025 08:52)  HR: 54 (22 May 2025 12:30) (52 - 58)  BP: 105/54 (22 May 2025 12:30) (100/56 - 162/75)  BP(mean): --  RR: 14 (22 May 2025 12:30) (14 - 17)  SpO2: 98% (22 May 2025 12:30) (98% - 100%)        MEDICATIONS  (STANDING):  chlorhexidine 4% Liquid 1 Application(s) Topical once  sodium chloride 0.9% Bolus 250 milliLiter(s) IV Bolus once  sodium chloride 0.9%. 1000 milliLiter(s) (100 mL/Hr) IV Continuous <Continuous>    MEDICATIONS  (PRN):      REVIEW OF SYSTEMS:          All negative except as mentioned in HPI    PHYSICAL EXAM:           CONSTITUTIONAL: Well-developed; well-nourished; in no acute distress  	SKIN: warm, dry  	HEAD: Normocephalic; atraumatic  	EYES: PERRL.  	ENT: No nasal discharge, airway clear, mucous membranes moist  	NECK: Supple; non tender.  	CARD: +S1, +S2, no murmurs, gallops, or rubs. Regular rate and rhythm    	RESP: No wheezes, rales or rhonchi. CTA B/L  	ABD: soft ntnd, + BS x 4 quadrants  	EXT: moves all extremities,  no clubbing, cyanosis or edema  	NEURO: Alert and oriented x3, no focal deficits          PSYCH: Cooperative, appropriate          VASCULAR:  + Rad / + PTs / +  DPs          EXTREMITY:              	   Right Radial: Dressing D/C/I, TR band, access site soft, no hematoma, no pain, + pulses, no sign of infection, no numbness            post pci BMP: 1.0 (pre 1.1)  post pci CBC:  post pCI EKD ECHO:    LABS:                        10.7   3.78  )-----------( 192      ( 22 May 2025 08:49 )             33.5     05-    137  |  106  |  12  ----------------------------<  88  3.8   |  23  |  1.0    Ca    8.4      22 May 2025 11:40              A/P:  I discussed the case with Cardiologist Dr. Rizo and recommend the following:    S/P PCI:   Intervention: Successful PCI of dRCA 99% stenosis with a mm PITER. 0% residual      Implants: SYNERGY XD 3.0X16MM       FINDINGS:     Coronary Dominance: right       LM: The segment is medium sized. Angiography shows minor irregularities.    LAD: The segment is medium sized. Angiography shows minor irregularities. Proximal left anterior descending: Angiography shows minor irregularities. Mid left anterior descending: Angiography shows minor irregularities. The previously placed stent is a drug-eluting stent and is patent. Existing stent is patent. There is no in-stent thrombosis. Distal left anterior descending: Angiography shows minor irregularities. First diagonal: The segment is medium sized. Angiography shows minor irregularities    CX: he segment is medium sized. Angiography shows mild atherosclerosis. Proximal circumflex: Angiography shows minor irregularities. Distal circumflex: Angiography shows minor irregularities. First obtuse marginal: The segment is medium sized. Angiography shows minor irregularities.         RCA: the segment is large, dominant. Proximal right coronary artery: Angiography shows minor irregularities. Mid right coronary artery: Angiography shows minor irregularities. Distal right coronary artery: Angiography shows severe atherosclerosis. There is a 99 % stenosis. Right posterior descending artery: The segment is medium sized. Angiography shows minor irregularities.                               CBC/ECG at 1415                   NS  _100 cc/hr x 6 _hr  	         Continue DAPT ( Aspirin 81 mg PO Daily and Plavix 75mg PO daily ), B-Blocker, Statin Therapy                   no ACE/ARB- bp soft, no DM                   Patient given 30 day supply of ( Aspirin 81 mg daily and Plavix 75 mg daily ) to take at home                   Patient agreeing to take DAPT for at least one year or as directed by cardiologist                    Pt given instructions on importance of taking antiplatelet medication or risk acute stent thrombosis/death                   Post cath instructions, access site care and activity restrictions reviewed with patient                     Discussed with patient to return to hospital if experience chest pain, shortness breath, dizziness and site bleeding                   Aggressive risk factor modification, diet counseling, smoking cessation discussed with patient                       Can discharge patient from cardiac standpoint at 1615 after ambulating without symptoms and access site wnl, ECG and blood work reviewed   Cardiac rehab information provided/ referral and communication for cardiac rehab completed                   Benefits of Cardiac Rehab discussed with patient,  Patient instructed to call and make first                               appointment after first f/u visit with Cardiologist                    Follow up with Cardiology Dr. Rizo   in  two weeks.  Instructed to call and make an appointment                      Discharge instructions as follows, when ready to d/c:                   - Continue medical regimen as prescribed to prevent chest pain                   - Continue dual anti-platelet therapy, beta blocker, statin,PPI                   - If you are diabetic and taking medication containing Metformin, do not take them for 48 hours after the procedure                   - Instructed to call 911 if chest pain, shortness of breath or bleeding from access site                   - No heavy lifting >10lbs x 1 week                   - No driving x 24 hours                   - No baths, swimming pools x 1 week, may shower                   - Low sodium low fat low cholesterol diet                   - Follow-up with Cardiologist in 2 weeks after discharge                                        Cardiology Follow up s/sp PCI     GABBI CARDOSO   69y Male  PAST MEDICAL & SURGICAL HISTORY:  High cholesterol  CAD (coronary artery disease)  CAD, multiple vessel  Severe aortic stenosis  Mild HTN  History of appendectomy  History of tonsillectomy  H/O bilateral inguinal hernia repair  H/O carpal tunnel repair           HPI:  Patient is a 69y Male PMH of CAD s/p PCI, severe AS, HLD, RBBB, LAFB, BONIFACIO, carpal tunnel syndrome, ED who initially presented to cardiologist from PMD with c/o some chest tightness with exercise. 05/09/25-Patient underwent CCTA which showed possible severe mLAD disease and was scheduled for LHC. Pt developed CP and went to [facility] for evaluation. He was found to also have severe AS. He complained of melena and had endoscopy and colonoscopy for eval and cleared for DAPT. Pt underwent LHC and PCI to LAD(Synergy 3.0x20mm) on 4/24/25  now presents for staged PCI to RCA. Patient still with some SOB and dizziness.      LHC: 4/24/25  Intervention: PCI of 70% mLAD stenosis   Implants: SYNERGY XD 3.0X20MM PITER    FINDINGS:   DOMINANCE: Right    LM: Minor luminal irregularities     LAD:  70% mid stenosis (hemodynamically significant by iFR 0.80). Mild distal disease  D1: Mild disease    CX: Mild disease   OM1: Minor luminal irregularities     RCA: 95% distal stenosis.   RPDA: Mild disease    LVEDP:  N/A  EF: 60%     ACC: [id number] EXAM:  CT ANGIO HEART CORONARY IC   ORDERED BY: SERGEY JOHNSON     PROCEDURE DATE:  03/25/2025          INTERPRETATION:  CLINICAL INDICATION: CCTA CHEST PAIN NO ALLERGIES NO   RENAL PT IS PRE MED WILL BRING IN SCRIPT    COMPARISON: CT chest dated 7/9/2009.    TECHNIQUE: Unenhanced axial ECG-triggered CT was obtained through the   chest. CT angiography of the heart was then performed utilizing ECG-gated   imaging. In preparation for the examination, the patient received 0.8 mg   of sublingual nitroglycerine for coronary vasodilatation. Curved   multiplanar, 3-D MIP, and volume rendered images were later reconstructed   at an independent workstation.    CONTRAST: Omnipaque 350. 90 cc administered. 10 cc discarded.    INTERPRETATION:    Calcium Score:    Vessel              Calcium Score    =======================================================  LM:                    21  LAD:                 687  LCX:                 133  RCA:                 287  =======================================================  Total:                1128      CORONARY CT ANGIOGRAM:    There is a right dominant coronary arterial system. The left main   coronary artery quadrificates.    LEFT MAIN: Punctate calcified plaque causing less than 50% stenosis.    LEFT ANTERIOR DESCENDING:    Proximal: Densely calcified plaque limiting luminal analysis with likely   up to moderate stenosis. Severe stenosis cannot be completely excluded.  Mid: Densely calcified plaque limiting luminal analysis with concern for   severe stenosis.  Distal: Normal.    First diagonal: Punctate calcification causing less than than 50%   stenosis.  Second diagonal: Diminutive.    RAMUS INTERMEDIUS: 2 small adjacent ramus branches are patent.    LEFT CIRCUMFLEX:    Proximal: Calcific plaque causing mild stenosis proximal to the first OM   branch point.  Mid/Distal: Normal.    OM1: Diminutive.  OM 2: Multiple foci of calcified plaque causing up to mild stenosis.    RIGHT CORONARY ARTERY:    Proximal: Noncalcified plaquescausing up to mild stenosis.  Mid: No significant stenosis.  Distal: Mixed calcified and noncalcified plaque causing up to subtotal   stenosis.  Posterior descending: Small caliber, no appreciable stenosis.    CARDIAC MORPHOLOGY: The cardiac chambersare normal in size  There is no   pericardial effusion. No left heart mass or thrombus. Right heart is   under opacified.    AORTIC VALVE: Tricuspid. Severe sclerotic changes with significant   calcifications.    IMAGED AORTA: The thoracic aorta is normal in caliber.    IMAGED EXTRACARDIAC FINDINGS:  The chest is only partially imaged.  Bilateral calcified hilar and mediastinal lymph nodes reflecting old   granulomatous disease.  6 mm solid nodule in the left upper lobe on series 307 image 292.  Adjacent ill-defined cystic lesion measuring approximately 1.3 cm in long   axis with peripheral thickness.  Visualized lungs without mass or consolidation. Visualized large airways   are patent.  Degenerative changes of the spine.    IMPRESSION:    Concern proximal to mid LAD up to severe stenosis secondary to density   calcified plaque with limits luminal evaluation.    Distal RCA mixed calcified and noncalcified plaque causing subtotal   stenosis.  Remaining nonobstructive disease as above.    CAD-RADS 4A.      Pre cath note:  indication:  [ ] STEMI                [ ] NSTEMI                 [ ] Acute coronary syndrome                   [ ]Unstable Angina   [ ] high risk  [ ] intermediate risk  [ ] low risk                   [x ] Stable Angina/staged PCI     non-invasive testing:       CCTA                    Date:                     result: [ ] high risk  [ ] intermediate risk  [ ] low risk    Anti- Anginal medications:                    [ ] not used d/t                     [x ] used (only 1 d/t )  (x ) BB     ( ) CCB      ( ) Nitrate   (  ) Ranexa          [ ] not used but strong indication not to use    Ejection Fraction                   [ ] <29            [ ] 30-39%   [ ] 40-49%     [x ]>50%    CHF          [ ] active (within last 14 days on meds   [ ] Chronic (on meds but no exacerbation)  NYHA Functional Class:  (  ) Class I (no limitations)  (  ) Class II (slight limitation)  (  ) Class III (marked limitation)  (  ) Class IV (symptoms at rest)    COPD                   [ ] mild (on chronic bronchodilators)  [ ] moderate (on chronic steroid therapy)      [ ] severe (indication for home O2 or PACO2 >50)    Other risk factors:                     [ ] Previous MI                     [ ] CVA/ stroke                    [ ] carotid stent/ CEA                    [ ] PVD/PAD- (arterial aneurysm, non-palpable pulses, tortuous vessel with inability to insert catheter, infra-renal dissection, renal or subclavian artery stenosis)                    [ ] previous CABG                    [ ] Renal Failure:  on HD  (  ) yes  (  ) no                    [ ] Diabetic  (  ) Type 1  (  ) Type 2                                         (  ) Insulin dependent  (  ) non-insulin dependent                                         (  ) Metformin  (  ) Januvia  (  ) Glimepiride  (  ) Glipizide  (  ) Glyburide  (  ) Actos                                         (  ) GLP-1 receptor agonists (Ozempic, Mounjaro, Wegovy, trulicity, Byetta, Victoza)                                         (  ) SGLT2 Inhibitors (Farxiga, Jardiance, Invokana)                                         (  ) Other    Bleeding Risk: 2.4%    Pre-cath Hydration: ( x )  cc IV bolus x 1 over 1 hr d/t severe AS  (  ) NS @ 75cc/hr until procedure (up to 2 hrs) if EF> 50%                                                                                                                         (  ) NS @ 50cc/hr until procedure (up to 2 hrs) if EF< 50%                                      (  ) No precath hydration d/t    RIGHT RADIAL ARTERY EVALUATION:  MARK TEST: [] Negative          [x] Positive    EF: 63%  Date: 4/2025    EKG: SR  Date: 4/25/25 (20 May 2025 12:56)    Allergies    No Known Allergies    Intolerances      Patient seen and examined at bedside.   Patient without complaints.   Denies CP, SOB, palpitations, or dizziness  No events on telemetry   Vital Signs Last 24 Hrs  T(C): 36.6 (22 May 2025 09:00), Max: 37.1 (22 May 2025 08:52)  T(F): 97.8 (22 May 2025 09:00), Max: 98.7 (22 May 2025 08:52)  HR: 54 (22 May 2025 12:30) (52 - 58)  BP: 105/54 (22 May 2025 12:30) (100/56 - 162/75)  BP(mean): --  RR: 14 (22 May 2025 12:30) (14 - 17)  SpO2: 98% (22 May 2025 12:30) (98% - 100%)        MEDICATIONS  (STANDING):  chlorhexidine 4% Liquid 1 Application(s) Topical once  sodium chloride 0.9% Bolus 250 milliLiter(s) IV Bolus once  sodium chloride 0.9%. 1000 milliLiter(s) (100 mL/Hr) IV Continuous <Continuous>    MEDICATIONS  (PRN):      REVIEW OF SYSTEMS:          All negative except as mentioned in HPI    PHYSICAL EXAM:           CONSTITUTIONAL: Well-developed; well-nourished; in no acute distress  	SKIN: warm, dry  	HEAD: Normocephalic; atraumatic  	EYES: PERRL.  	ENT: No nasal discharge, airway clear, mucous membranes moist  	NECK: Supple; non tender.  	CARD: +S1, +S2, no murmurs, gallops, or rubs. Regular rate and rhythm    	RESP: No wheezes, rales or rhonchi. CTA B/L  	ABD: soft ntnd, + BS x 4 quadrants  	EXT: moves all extremities,  no clubbing, cyanosis or edema  	NEURO: Alert and oriented x3, no focal deficits          PSYCH: Cooperative, appropriate          VASCULAR:  + Rad / + PTs / +  DPs          EXTREMITY:              	   Right Radial: Dressing D/C/I, TR band, access site soft, no hematoma, no pain, + pulses, no sign of infection, no numbness            post pci BMP: 1.0 (pre 1.1)  post pci CBC: 9.6  post pCI EKG: no acute changes                                                                                                                2D ECHO:    LABS:                        10.7   3.78  )-----------( 192      ( 22 May 2025 08:49 )             33.5     05-22    137  |  106  |  12  ----------------------------<  88  3.8   |  23  |  1.0    Ca    8.4      22 May 2025 11:40              A/P:  I discussed the case with Cardiologist Dr. Rizo and recommend the following:    S/P PCI:   Intervention: Successful PCI of dRCA 99% stenosis with a mm PITER. 0% residual      Implants: SYNERGY XD 3.0X16MM       FINDINGS:     Coronary Dominance: right       LM: The segment is medium sized. Angiography shows minor irregularities.    LAD: The segment is medium sized. Angiography shows minor irregularities. Proximal left anterior descending: Angiography shows minor irregularities. Mid left anterior descending: Angiography shows minor irregularities. The previously placed stent is a drug-eluting stent and is patent. Existing stent is patent. There is no in-stent thrombosis. Distal left anterior descending: Angiography shows minor irregularities. First diagonal: The segment is medium sized. Angiography shows minor irregularities    CX: he segment is medium sized. Angiography shows mild atherosclerosis. Proximal circumflex: Angiography shows minor irregularities. Distal circumflex: Angiography shows minor irregularities. First obtuse marginal: The segment is medium sized. Angiography shows minor irregularities.         RCA: the segment is large, dominant. Proximal right coronary artery: Angiography shows minor irregularities. Mid right coronary artery: Angiography shows minor irregularities. Distal right coronary artery: Angiography shows severe atherosclerosis. There is a 99 % stenosis. Right posterior descending artery: The segment is medium sized. Angiography shows minor irregularities.                               CBC/ECG at 1415                   NS  _100 cc/hr x 6 _hr  	         Continue DAPT ( Aspirin 81 mg PO Daily and Plavix 75mg PO daily ), B-Blocker, Statin Therapy                   no ACE/ARB- bp soft, no DM                   Patient given 30 day supply of ( Aspirin 81 mg daily and Plavix 75 mg daily ) to take at home                   Patient agreeing to take DAPT for at least one year or as directed by cardiologist                    Pt given instructions on importance of taking antiplatelet medication or risk acute stent thrombosis/death                   Post cath instructions, access site care and activity restrictions reviewed with patient                     Discussed with patient to return to hospital if experience chest pain, shortness breath, dizziness and site bleeding                   Aggressive risk factor modification, diet counseling, smoking cessation discussed with patient                       Can discharge patient from cardiac standpoint at 1615 after ambulating without symptoms and access site wnl, ECG and blood work reviewed   Cardiac rehab information provided/ referral and communication for cardiac rehab completed                   Benefits of Cardiac Rehab discussed with patient,  Patient instructed to call and make first                               appointment after first f/u visit with Cardiologist                    Follow up with Cardiology Dr. Rizo   in  two weeks.  Instructed to call and make an appointment                      Discharge instructions as follows, when ready to d/c:                   - Continue medical regimen as prescribed to prevent chest pain                   - Continue dual anti-platelet therapy, beta blocker, statin,PPI                   - If you are diabetic and taking medication containing Metformin, do not take them for 48 hours after the procedure                   - Instructed to call 911 if chest pain, shortness of breath or bleeding from access site                   - No heavy lifting >10lbs x 1 week                   - No driving x 24 hours                   - No baths, swimming pools x 1 week, may shower                   - Low sodium low fat low cholesterol diet                   - Follow-up with Cardiologist in 2 weeks after discharge

## 2025-05-22 NOTE — ASU PATIENT PROFILE, ADULT - FALL HARM RISK - UNIVERSAL INTERVENTIONS
Bed in lowest position, wheels locked, appropriate side rails in place/Call bell, personal items and telephone in reach/Instruct patient to call for assistance before getting out of bed or chair/Non-slip footwear when patient is out of bed/[place] to call system/Physically safe environment - no spills, clutter or unnecessary equipment/Purposeful Proactive Rounding/Room/bathroom lighting operational, light cord in reach [FreeTextEntry1] : CXR 4/19: new RUL mass from 8/18

## 2025-05-27 ENCOUNTER — RESULT REVIEW (OUTPATIENT)
Age: 70
End: 2025-05-27

## 2025-05-27 ENCOUNTER — APPOINTMENT (OUTPATIENT)
Dept: CV DIAGNOSITCS | Facility: HOSPITAL | Age: 70
End: 2025-05-27
Payer: MEDICARE

## 2025-05-27 ENCOUNTER — OUTPATIENT (OUTPATIENT)
Dept: OUTPATIENT SERVICES | Facility: HOSPITAL | Age: 70
LOS: 1 days | End: 2025-05-27
Payer: MEDICARE

## 2025-05-27 DIAGNOSIS — E78.5 HYPERLIPIDEMIA, UNSPECIFIED: ICD-10-CM

## 2025-05-27 DIAGNOSIS — I35.0 NONRHEUMATIC AORTIC (VALVE) STENOSIS: ICD-10-CM

## 2025-05-27 DIAGNOSIS — I25.10 ATHEROSCLEROTIC HEART DISEASE OF NATIVE CORONARY ARTERY WITHOUT ANGINA PECTORIS: ICD-10-CM

## 2025-05-27 DIAGNOSIS — I10 ESSENTIAL (PRIMARY) HYPERTENSION: ICD-10-CM

## 2025-05-27 DIAGNOSIS — Z95.5 PRESENCE OF CORONARY ANGIOPLASTY IMPLANT AND GRAFT: ICD-10-CM

## 2025-05-27 DIAGNOSIS — Z98.890 OTHER SPECIFIED POSTPROCEDURAL STATES: Chronic | ICD-10-CM

## 2025-05-27 DIAGNOSIS — Z90.89 ACQUIRED ABSENCE OF OTHER ORGANS: Chronic | ICD-10-CM

## 2025-05-27 PROCEDURE — 93307 TTE W/O DOPPLER COMPLETE: CPT | Mod: 26

## 2025-05-27 PROCEDURE — 94010 BREATHING CAPACITY TEST: CPT | Mod: 26

## 2025-05-27 PROCEDURE — 94727 GAS DIL/WSHOT DETER LNG VOL: CPT | Mod: 26

## 2025-05-27 PROCEDURE — 94729 DIFFUSING CAPACITY: CPT | Mod: 26

## 2025-05-27 PROCEDURE — 93307 TTE W/O DOPPLER COMPLETE: CPT

## 2025-05-28 DIAGNOSIS — I10 ESSENTIAL (PRIMARY) HYPERTENSION: ICD-10-CM

## 2025-06-06 ENCOUNTER — OUTPATIENT (OUTPATIENT)
Dept: OUTPATIENT SERVICES | Facility: HOSPITAL | Age: 70
LOS: 1 days | End: 2025-06-06
Payer: MEDICARE

## 2025-06-06 ENCOUNTER — RESULT REVIEW (OUTPATIENT)
Age: 70
End: 2025-06-06

## 2025-06-06 DIAGNOSIS — Z90.89 ACQUIRED ABSENCE OF OTHER ORGANS: Chronic | ICD-10-CM

## 2025-06-06 DIAGNOSIS — Z98.890 OTHER SPECIFIED POSTPROCEDURAL STATES: Chronic | ICD-10-CM

## 2025-06-06 DIAGNOSIS — Z00.8 ENCOUNTER FOR OTHER GENERAL EXAMINATION: ICD-10-CM

## 2025-06-06 DIAGNOSIS — I35.0 NONRHEUMATIC AORTIC (VALVE) STENOSIS: ICD-10-CM

## 2025-06-06 PROCEDURE — 94729 DIFFUSING CAPACITY: CPT

## 2025-06-06 PROCEDURE — 74174 CTA ABD&PLVS W/CONTRAST: CPT

## 2025-06-06 PROCEDURE — 75574 CT ANGIO HRT W/3D IMAGE: CPT | Mod: 26

## 2025-06-06 PROCEDURE — 94010 BREATHING CAPACITY TEST: CPT

## 2025-06-06 PROCEDURE — 94726 PLETHYSMOGRAPHY LUNG VOLUMES: CPT

## 2025-06-06 PROCEDURE — 74174 CTA ABD&PLVS W/CONTRAST: CPT | Mod: 26

## 2025-06-06 PROCEDURE — 93880 EXTRACRANIAL BILAT STUDY: CPT | Mod: 26

## 2025-06-06 PROCEDURE — 93880 EXTRACRANIAL BILAT STUDY: CPT

## 2025-06-06 PROCEDURE — 75574 CT ANGIO HRT W/3D IMAGE: CPT

## 2025-06-07 DIAGNOSIS — I35.0 NONRHEUMATIC AORTIC (VALVE) STENOSIS: ICD-10-CM

## 2025-06-19 ENCOUNTER — APPOINTMENT (OUTPATIENT)
Dept: CARDIOLOGY | Facility: CLINIC | Age: 70
End: 2025-06-19

## 2025-06-19 VITALS
RESPIRATION RATE: 16 BRPM | HEIGHT: 66.5 IN | WEIGHT: 163 LBS | SYSTOLIC BLOOD PRESSURE: 122 MMHG | HEART RATE: 60 BPM | BODY MASS INDEX: 25.89 KG/M2 | OXYGEN SATURATION: 98 % | TEMPERATURE: 98 F | DIASTOLIC BLOOD PRESSURE: 74 MMHG

## 2025-06-19 PROCEDURE — 99214 OFFICE O/P EST MOD 30 MIN: CPT

## 2025-06-23 ENCOUNTER — NON-APPOINTMENT (OUTPATIENT)
Age: 70
End: 2025-06-23

## 2025-06-23 PROBLEM — I35.0 NONRHEUMATIC AORTIC (VALVE) STENOSIS: Chronic | Status: ACTIVE | Noted: 2025-05-22

## 2025-06-23 PROBLEM — I25.10 ATHEROSCLEROTIC HEART DISEASE OF NATIVE CORONARY ARTERY WITHOUT ANGINA PECTORIS: Chronic | Status: ACTIVE | Noted: 2025-05-22

## 2025-06-23 PROBLEM — I10 ESSENTIAL (PRIMARY) HYPERTENSION: Chronic | Status: ACTIVE | Noted: 2025-05-22

## 2025-07-07 ENCOUNTER — RESULT REVIEW (OUTPATIENT)
Age: 70
End: 2025-07-07

## 2025-07-07 ENCOUNTER — OUTPATIENT (OUTPATIENT)
Dept: OUTPATIENT SERVICES | Facility: HOSPITAL | Age: 70
LOS: 1 days | End: 2025-07-07
Payer: MEDICARE

## 2025-07-07 DIAGNOSIS — Z98.890 OTHER SPECIFIED POSTPROCEDURAL STATES: Chronic | ICD-10-CM

## 2025-07-07 DIAGNOSIS — Z90.89 ACQUIRED ABSENCE OF OTHER ORGANS: Chronic | ICD-10-CM

## 2025-07-07 DIAGNOSIS — R91.1 SOLITARY PULMONARY NODULE: ICD-10-CM

## 2025-07-07 LAB — GLUCOSE BLDC GLUCOMTR-MCNC: 74 MG/DL — SIGNIFICANT CHANGE UP (ref 70–99)

## 2025-07-07 PROCEDURE — 78815 PET IMAGE W/CT SKULL-THIGH: CPT | Mod: 26,PI

## 2025-07-07 PROCEDURE — 78815 PET IMAGE W/CT SKULL-THIGH: CPT | Mod: PI

## 2025-07-07 PROCEDURE — 82962 GLUCOSE BLOOD TEST: CPT

## 2025-07-07 PROCEDURE — A9552: CPT

## 2025-07-08 ENCOUNTER — APPOINTMENT (OUTPATIENT)
Dept: CARDIOLOGY | Facility: CLINIC | Age: 70
End: 2025-07-08
Payer: MEDICARE

## 2025-07-08 VITALS
HEIGHT: 66 IN | SYSTOLIC BLOOD PRESSURE: 124 MMHG | OXYGEN SATURATION: 98 % | HEART RATE: 70 BPM | WEIGHT: 163 LBS | DIASTOLIC BLOOD PRESSURE: 69 MMHG | BODY MASS INDEX: 26.2 KG/M2

## 2025-07-08 DIAGNOSIS — R91.1 SOLITARY PULMONARY NODULE: ICD-10-CM

## 2025-07-08 PROBLEM — Z86.39 HISTORY OF HIGH CHOLESTEROL: Status: RESOLVED | Noted: 2022-10-28 | Resolved: 2025-07-08

## 2025-07-08 PROCEDURE — 99214 OFFICE O/P EST MOD 30 MIN: CPT

## 2025-07-08 PROCEDURE — G2211 COMPLEX E/M VISIT ADD ON: CPT

## 2025-07-14 ENCOUNTER — APPOINTMENT (OUTPATIENT)
Dept: CARDIOTHORACIC SURGERY | Facility: CLINIC | Age: 70
End: 2025-07-14
Payer: MEDICARE

## 2025-07-14 VITALS
HEART RATE: 69 BPM | BODY MASS INDEX: 26.52 KG/M2 | HEIGHT: 66 IN | SYSTOLIC BLOOD PRESSURE: 141 MMHG | TEMPERATURE: 98.3 F | DIASTOLIC BLOOD PRESSURE: 77 MMHG | WEIGHT: 165 LBS | RESPIRATION RATE: 15 BRPM | OXYGEN SATURATION: 98 %

## 2025-07-14 PROBLEM — R59.0 MEDIASTINAL LYMPHADENOPATHY: Status: ACTIVE | Noted: 2025-07-14

## 2025-07-14 PROCEDURE — 99204 OFFICE O/P NEW MOD 45 MIN: CPT

## 2025-07-15 ENCOUNTER — NON-APPOINTMENT (OUTPATIENT)
Age: 70
End: 2025-07-15

## 2025-07-17 ENCOUNTER — NON-APPOINTMENT (OUTPATIENT)
Age: 70
End: 2025-07-17

## 2025-07-19 PROBLEM — R91.1 LUNG NODULE: Status: ACTIVE | Noted: 2025-06-23

## 2025-07-21 ENCOUNTER — OUTPATIENT (OUTPATIENT)
Dept: OUTPATIENT SERVICES | Facility: HOSPITAL | Age: 70
LOS: 1 days | End: 2025-07-21
Payer: MEDICARE

## 2025-07-21 ENCOUNTER — RESULT REVIEW (OUTPATIENT)
Age: 70
End: 2025-07-21

## 2025-07-21 VITALS
WEIGHT: 164.91 LBS | OXYGEN SATURATION: 100 % | TEMPERATURE: 98 F | HEART RATE: 80 BPM | RESPIRATION RATE: 16 BRPM | HEIGHT: 66 IN | DIASTOLIC BLOOD PRESSURE: 74 MMHG | SYSTOLIC BLOOD PRESSURE: 124 MMHG

## 2025-07-21 DIAGNOSIS — Z98.890 OTHER SPECIFIED POSTPROCEDURAL STATES: Chronic | ICD-10-CM

## 2025-07-21 DIAGNOSIS — Z01.818 ENCOUNTER FOR OTHER PREPROCEDURAL EXAMINATION: ICD-10-CM

## 2025-07-21 DIAGNOSIS — Z90.89 ACQUIRED ABSENCE OF OTHER ORGANS: Chronic | ICD-10-CM

## 2025-07-21 DIAGNOSIS — I35.0 NONRHEUMATIC AORTIC (VALVE) STENOSIS: ICD-10-CM

## 2025-07-21 LAB
A1C WITH ESTIMATED AVERAGE GLUCOSE RESULT: 5.1 % — SIGNIFICANT CHANGE UP (ref 4–5.6)
ALBUMIN SERPL ELPH-MCNC: 4.3 G/DL — SIGNIFICANT CHANGE UP (ref 3.5–5.2)
ALP SERPL-CCNC: 129 U/L — HIGH (ref 30–115)
ALT FLD-CCNC: 11 U/L — SIGNIFICANT CHANGE UP (ref 0–41)
ANION GAP SERPL CALC-SCNC: 11 MMOL/L — SIGNIFICANT CHANGE UP (ref 7–14)
APPEARANCE UR: CLEAR — SIGNIFICANT CHANGE UP
APTT BLD: 34.3 SEC — SIGNIFICANT CHANGE UP (ref 27–39.2)
AST SERPL-CCNC: 22 U/L — SIGNIFICANT CHANGE UP (ref 0–41)
BASOPHILS # BLD AUTO: 0.06 K/UL — SIGNIFICANT CHANGE UP (ref 0–0.2)
BASOPHILS NFR BLD AUTO: 1.9 % — HIGH (ref 0–1)
BILIRUB SERPL-MCNC: 0.6 MG/DL — SIGNIFICANT CHANGE UP (ref 0.2–1.2)
BILIRUB UR-MCNC: NEGATIVE — SIGNIFICANT CHANGE UP
BLD GP AB SCN SERPL QL: SIGNIFICANT CHANGE UP
BUN SERPL-MCNC: 19 MG/DL — SIGNIFICANT CHANGE UP (ref 10–20)
CALCIUM SERPL-MCNC: 9.4 MG/DL — SIGNIFICANT CHANGE UP (ref 8.4–10.5)
CHLORIDE SERPL-SCNC: 105 MMOL/L — SIGNIFICANT CHANGE UP (ref 98–110)
CO2 SERPL-SCNC: 26 MMOL/L — SIGNIFICANT CHANGE UP (ref 17–32)
COLOR SPEC: YELLOW — SIGNIFICANT CHANGE UP
CREAT SERPL-MCNC: 1 MG/DL — SIGNIFICANT CHANGE UP (ref 0.7–1.5)
DIFF PNL FLD: NEGATIVE — SIGNIFICANT CHANGE UP
EGFR: 80 ML/MIN/1.73M2 — SIGNIFICANT CHANGE UP
EGFR: 80 ML/MIN/1.73M2 — SIGNIFICANT CHANGE UP
EOSINOPHIL # BLD AUTO: 0.13 K/UL — SIGNIFICANT CHANGE UP (ref 0–0.7)
EOSINOPHIL NFR BLD AUTO: 4.2 % — SIGNIFICANT CHANGE UP (ref 0–8)
ESTIMATED AVERAGE GLUCOSE: 100 MG/DL — SIGNIFICANT CHANGE UP (ref 68–114)
GLUCOSE SERPL-MCNC: 86 MG/DL — SIGNIFICANT CHANGE UP (ref 70–99)
GLUCOSE UR QL: NEGATIVE MG/DL — SIGNIFICANT CHANGE UP
HCT VFR BLD CALC: 36.6 % — LOW (ref 42–52)
HGB BLD-MCNC: 10.9 G/DL — LOW (ref 14–18)
IMM GRANULOCYTES NFR BLD AUTO: 0 % — LOW (ref 0.1–0.3)
INR BLD: 0.95 RATIO — SIGNIFICANT CHANGE UP (ref 0.65–1.3)
KETONES UR QL: ABNORMAL MG/DL
LEUKOCYTE ESTERASE UR-ACNC: NEGATIVE — SIGNIFICANT CHANGE UP
LYMPHOCYTES # BLD AUTO: 0.61 K/UL — LOW (ref 1.2–3.4)
LYMPHOCYTES # BLD AUTO: 19.6 % — LOW (ref 20.5–51.1)
MCHC RBC-ENTMCNC: 21.6 PG — LOW (ref 27–31)
MCHC RBC-ENTMCNC: 29.8 G/DL — LOW (ref 32–37)
MCV RBC AUTO: 72.6 FL — LOW (ref 80–94)
MONOCYTES # BLD AUTO: 0.24 K/UL — SIGNIFICANT CHANGE UP (ref 0.1–0.6)
MONOCYTES NFR BLD AUTO: 7.7 % — SIGNIFICANT CHANGE UP (ref 1.7–9.3)
MRSA PCR RESULT.: NEGATIVE — SIGNIFICANT CHANGE UP
NEUTROPHILS # BLD AUTO: 2.07 K/UL — SIGNIFICANT CHANGE UP (ref 1.4–6.5)
NEUTROPHILS NFR BLD AUTO: 66.6 % — SIGNIFICANT CHANGE UP (ref 42.2–75.2)
NITRITE UR-MCNC: NEGATIVE — SIGNIFICANT CHANGE UP
NRBC BLD AUTO-RTO: 0 /100 WBCS — SIGNIFICANT CHANGE UP (ref 0–0)
NT-PROBNP SERPL-SCNC: 95 PG/ML — SIGNIFICANT CHANGE UP (ref 0–300)
PH UR: 6 — SIGNIFICANT CHANGE UP (ref 5–8)
PLATELET # BLD AUTO: 188 K/UL — SIGNIFICANT CHANGE UP (ref 130–400)
PMV BLD: 10.4 FL — SIGNIFICANT CHANGE UP (ref 7.4–10.4)
POTASSIUM SERPL-MCNC: 4.5 MMOL/L — SIGNIFICANT CHANGE UP (ref 3.5–5)
POTASSIUM SERPL-SCNC: 4.5 MMOL/L — SIGNIFICANT CHANGE UP (ref 3.5–5)
PROT SERPL-MCNC: 7.1 G/DL — SIGNIFICANT CHANGE UP (ref 6–8)
PROT UR-MCNC: SIGNIFICANT CHANGE UP MG/DL
PROTHROM AB SERPL-ACNC: 11.2 SEC — SIGNIFICANT CHANGE UP (ref 9.95–12.87)
RBC # BLD: 5.04 M/UL — SIGNIFICANT CHANGE UP (ref 4.7–6.1)
RBC # FLD: 17 % — HIGH (ref 11.5–14.5)
SODIUM SERPL-SCNC: 142 MMOL/L — SIGNIFICANT CHANGE UP (ref 135–146)
SP GR SPEC: 1.03 — SIGNIFICANT CHANGE UP (ref 1–1.03)
UROBILINOGEN FLD QL: 1 MG/DL — SIGNIFICANT CHANGE UP (ref 0.2–1)
WBC # BLD: 3.11 K/UL — LOW (ref 4.8–10.8)
WBC # FLD AUTO: 3.11 K/UL — LOW (ref 4.8–10.8)

## 2025-07-21 PROCEDURE — 86901 BLOOD TYPING SEROLOGIC RH(D): CPT

## 2025-07-21 PROCEDURE — 81003 URINALYSIS AUTO W/O SCOPE: CPT

## 2025-07-21 PROCEDURE — 83036 HEMOGLOBIN GLYCOSYLATED A1C: CPT

## 2025-07-21 PROCEDURE — 86900 BLOOD TYPING SEROLOGIC ABO: CPT

## 2025-07-21 PROCEDURE — 71046 X-RAY EXAM CHEST 2 VIEWS: CPT | Mod: 26

## 2025-07-21 PROCEDURE — 85730 THROMBOPLASTIN TIME PARTIAL: CPT

## 2025-07-21 PROCEDURE — 85025 COMPLETE CBC W/AUTO DIFF WBC: CPT

## 2025-07-21 PROCEDURE — 71046 X-RAY EXAM CHEST 2 VIEWS: CPT

## 2025-07-21 PROCEDURE — 87641 MR-STAPH DNA AMP PROBE: CPT

## 2025-07-21 PROCEDURE — 93010 ELECTROCARDIOGRAM REPORT: CPT

## 2025-07-21 PROCEDURE — 36415 COLL VENOUS BLD VENIPUNCTURE: CPT

## 2025-07-21 PROCEDURE — 83880 ASSAY OF NATRIURETIC PEPTIDE: CPT

## 2025-07-21 PROCEDURE — 80053 COMPREHEN METABOLIC PANEL: CPT

## 2025-07-21 PROCEDURE — 93005 ELECTROCARDIOGRAM TRACING: CPT

## 2025-07-21 PROCEDURE — 99214 OFFICE O/P EST MOD 30 MIN: CPT | Mod: 25

## 2025-07-21 PROCEDURE — 86850 RBC ANTIBODY SCREEN: CPT

## 2025-07-21 PROCEDURE — 87086 URINE CULTURE/COLONY COUNT: CPT

## 2025-07-21 PROCEDURE — 85610 PROTHROMBIN TIME: CPT

## 2025-07-21 PROCEDURE — 87640 STAPH A DNA AMP PROBE: CPT

## 2025-07-22 DIAGNOSIS — I35.0 NONRHEUMATIC AORTIC (VALVE) STENOSIS: ICD-10-CM

## 2025-07-22 DIAGNOSIS — Z01.818 ENCOUNTER FOR OTHER PREPROCEDURAL EXAMINATION: ICD-10-CM

## 2025-07-22 LAB
CULTURE RESULTS: SIGNIFICANT CHANGE UP
SPECIMEN SOURCE: SIGNIFICANT CHANGE UP

## 2025-07-23 ENCOUNTER — RESULT REVIEW (OUTPATIENT)
Age: 70
End: 2025-07-23

## 2025-07-23 ENCOUNTER — TRANSCRIPTION ENCOUNTER (OUTPATIENT)
Age: 70
End: 2025-07-23

## 2025-07-23 ENCOUNTER — APPOINTMENT (OUTPATIENT)
Dept: CARDIOTHORACIC SURGERY | Facility: HOSPITAL | Age: 70
End: 2025-07-23

## 2025-07-23 ENCOUNTER — INPATIENT (INPATIENT)
Facility: HOSPITAL | Age: 70
LOS: 0 days | Discharge: HOME | DRG: 267 | End: 2025-07-24
Attending: THORACIC SURGERY (CARDIOTHORACIC VASCULAR SURGERY) | Admitting: THORACIC SURGERY (CARDIOTHORACIC VASCULAR SURGERY)
Payer: MEDICARE

## 2025-07-23 VITALS
HEIGHT: 65.98 IN | TEMPERATURE: 98 F | OXYGEN SATURATION: 100 % | RESPIRATION RATE: 18 BRPM | HEART RATE: 54 BPM | SYSTOLIC BLOOD PRESSURE: 146 MMHG | DIASTOLIC BLOOD PRESSURE: 65 MMHG | WEIGHT: 164.91 LBS

## 2025-07-23 DIAGNOSIS — I35.0 NONRHEUMATIC AORTIC (VALVE) STENOSIS: ICD-10-CM

## 2025-07-23 DIAGNOSIS — Z98.890 OTHER SPECIFIED POSTPROCEDURAL STATES: Chronic | ICD-10-CM

## 2025-07-23 DIAGNOSIS — Z90.89 ACQUIRED ABSENCE OF OTHER ORGANS: Chronic | ICD-10-CM

## 2025-07-23 LAB
ALBUMIN SERPL ELPH-MCNC: 3.9 G/DL — SIGNIFICANT CHANGE UP (ref 3.5–5.2)
ALP SERPL-CCNC: 89 U/L — SIGNIFICANT CHANGE UP (ref 30–115)
ALT FLD-CCNC: 11 U/L — SIGNIFICANT CHANGE UP (ref 0–41)
ANION GAP SERPL CALC-SCNC: 10 MMOL/L — SIGNIFICANT CHANGE UP (ref 7–14)
ANION GAP SERPL CALC-SCNC: 9 MMOL/L — SIGNIFICANT CHANGE UP (ref 7–14)
APTT BLD: 37.3 SEC — SIGNIFICANT CHANGE UP (ref 27–39.2)
APTT BLD: 42.1 SEC — HIGH (ref 27–39.2)
AST SERPL-CCNC: 19 U/L — SIGNIFICANT CHANGE UP (ref 0–41)
BILIRUB SERPL-MCNC: 0.7 MG/DL — SIGNIFICANT CHANGE UP (ref 0.2–1.2)
BUN SERPL-MCNC: 10 MG/DL — SIGNIFICANT CHANGE UP (ref 10–20)
BUN SERPL-MCNC: 13 MG/DL — SIGNIFICANT CHANGE UP (ref 10–20)
CALCIUM SERPL-MCNC: 8.3 MG/DL — LOW (ref 8.4–10.5)
CALCIUM SERPL-MCNC: 9.6 MG/DL — SIGNIFICANT CHANGE UP (ref 8.4–10.5)
CHLORIDE SERPL-SCNC: 101 MMOL/L — SIGNIFICANT CHANGE UP (ref 98–110)
CHLORIDE SERPL-SCNC: 102 MMOL/L — SIGNIFICANT CHANGE UP (ref 98–110)
CO2 SERPL-SCNC: 25 MMOL/L — SIGNIFICANT CHANGE UP (ref 17–32)
CO2 SERPL-SCNC: 27 MMOL/L — SIGNIFICANT CHANGE UP (ref 17–32)
CREAT SERPL-MCNC: 0.7 MG/DL — SIGNIFICANT CHANGE UP (ref 0.7–1.5)
CREAT SERPL-MCNC: 0.9 MG/DL — SIGNIFICANT CHANGE UP (ref 0.7–1.5)
EGFR: 91 ML/MIN/1.73M2 — SIGNIFICANT CHANGE UP
EGFR: 91 ML/MIN/1.73M2 — SIGNIFICANT CHANGE UP
EGFR: 98 ML/MIN/1.73M2 — SIGNIFICANT CHANGE UP
EGFR: 98 ML/MIN/1.73M2 — SIGNIFICANT CHANGE UP
GAS PNL BLDA: SIGNIFICANT CHANGE UP
GLUCOSE SERPL-MCNC: 86 MG/DL — SIGNIFICANT CHANGE UP (ref 70–99)
GLUCOSE SERPL-MCNC: 99 MG/DL — SIGNIFICANT CHANGE UP (ref 70–99)
HCT VFR BLD CALC: 33 % — LOW (ref 42–52)
HCT VFR BLD CALC: 39 % — LOW (ref 42–52)
HGB BLD-MCNC: 10.2 G/DL — LOW (ref 14–18)
HGB BLD-MCNC: 11.9 G/DL — LOW (ref 14–18)
INR BLD: 0.92 RATIO — SIGNIFICANT CHANGE UP (ref 0.65–1.3)
INR BLD: 1.06 RATIO — SIGNIFICANT CHANGE UP (ref 0.65–1.3)
MAGNESIUM SERPL-MCNC: 2.1 MG/DL — SIGNIFICANT CHANGE UP (ref 1.8–2.4)
MAGNESIUM SERPL-MCNC: 2.2 MG/DL — SIGNIFICANT CHANGE UP (ref 1.8–2.4)
MCHC RBC-ENTMCNC: 22.2 PG — LOW (ref 27–31)
MCHC RBC-ENTMCNC: 22.4 PG — LOW (ref 27–31)
MCHC RBC-ENTMCNC: 30.5 G/DL — LOW (ref 32–37)
MCHC RBC-ENTMCNC: 30.9 G/DL — LOW (ref 32–37)
MCV RBC AUTO: 71.7 FL — LOW (ref 80–94)
MCV RBC AUTO: 73.4 FL — LOW (ref 80–94)
NRBC BLD AUTO-RTO: 0 /100 WBCS — SIGNIFICANT CHANGE UP (ref 0–0)
NRBC BLD AUTO-RTO: 0 /100 WBCS — SIGNIFICANT CHANGE UP (ref 0–0)
NT-PROBNP SERPL-SCNC: 210 PG/ML — SIGNIFICANT CHANGE UP (ref 0–300)
PLATELET # BLD AUTO: 116 K/UL — LOW (ref 130–400)
PLATELET # BLD AUTO: 187 K/UL — SIGNIFICANT CHANGE UP (ref 130–400)
PMV BLD: 10.3 FL — SIGNIFICANT CHANGE UP (ref 7.4–10.4)
PMV BLD: 9.6 FL — SIGNIFICANT CHANGE UP (ref 7.4–10.4)
POTASSIUM SERPL-MCNC: 4 MMOL/L — SIGNIFICANT CHANGE UP (ref 3.5–5)
POTASSIUM SERPL-MCNC: 4.2 MMOL/L — SIGNIFICANT CHANGE UP (ref 3.5–5)
POTASSIUM SERPL-SCNC: 4 MMOL/L — SIGNIFICANT CHANGE UP (ref 3.5–5)
POTASSIUM SERPL-SCNC: 4.2 MMOL/L — SIGNIFICANT CHANGE UP (ref 3.5–5)
PROT SERPL-MCNC: 6.2 G/DL — SIGNIFICANT CHANGE UP (ref 6–8)
PROTHROM AB SERPL-ACNC: 10.8 SEC — SIGNIFICANT CHANGE UP (ref 9.95–12.87)
PROTHROM AB SERPL-ACNC: 12.5 SEC — SIGNIFICANT CHANGE UP (ref 9.95–12.87)
RBC # BLD: 4.6 M/UL — LOW (ref 4.7–6.1)
RBC # BLD: 5.31 M/UL — SIGNIFICANT CHANGE UP (ref 4.7–6.1)
RBC # FLD: 16.6 % — HIGH (ref 11.5–14.5)
RBC # FLD: 16.8 % — HIGH (ref 11.5–14.5)
SODIUM SERPL-SCNC: 136 MMOL/L — SIGNIFICANT CHANGE UP (ref 135–146)
SODIUM SERPL-SCNC: 138 MMOL/L — SIGNIFICANT CHANGE UP (ref 135–146)
WBC # BLD: 3.25 K/UL — LOW (ref 4.8–10.8)
WBC # BLD: 3.89 K/UL — LOW (ref 4.8–10.8)
WBC # FLD AUTO: 3.25 K/UL — LOW (ref 4.8–10.8)
WBC # FLD AUTO: 3.89 K/UL — LOW (ref 4.8–10.8)

## 2025-07-23 PROCEDURE — 83735 ASSAY OF MAGNESIUM: CPT

## 2025-07-23 PROCEDURE — 86901 BLOOD TYPING SEROLOGIC RH(D): CPT

## 2025-07-23 PROCEDURE — 93306 TTE W/DOPPLER COMPLETE: CPT

## 2025-07-23 PROCEDURE — C1894: CPT

## 2025-07-23 PROCEDURE — 84132 ASSAY OF SERUM POTASSIUM: CPT

## 2025-07-23 PROCEDURE — 99221 1ST HOSP IP/OBS SF/LOW 40: CPT | Mod: FS,25

## 2025-07-23 PROCEDURE — 36415 COLL VENOUS BLD VENIPUNCTURE: CPT

## 2025-07-23 PROCEDURE — 86923 COMPATIBILITY TEST ELECTRIC: CPT

## 2025-07-23 PROCEDURE — 97161 PT EVAL LOW COMPLEX 20 MIN: CPT | Mod: GP

## 2025-07-23 PROCEDURE — L8699: CPT

## 2025-07-23 PROCEDURE — 33361 REPLACE AORTIC VALVE PERQ: CPT | Mod: Q0

## 2025-07-23 PROCEDURE — 93306 TTE W/DOPPLER COMPLETE: CPT | Mod: 26

## 2025-07-23 PROCEDURE — 93010 ELECTROCARDIOGRAM REPORT: CPT

## 2025-07-23 PROCEDURE — 85014 HEMATOCRIT: CPT

## 2025-07-23 PROCEDURE — 85610 PROTHROMBIN TIME: CPT

## 2025-07-23 PROCEDURE — 85018 HEMOGLOBIN: CPT

## 2025-07-23 PROCEDURE — 83880 ASSAY OF NATRIURETIC PEPTIDE: CPT

## 2025-07-23 PROCEDURE — C1769: CPT

## 2025-07-23 PROCEDURE — 85027 COMPLETE CBC AUTOMATED: CPT

## 2025-07-23 PROCEDURE — 71045 X-RAY EXAM CHEST 1 VIEW: CPT

## 2025-07-23 PROCEDURE — 86900 BLOOD TYPING SEROLOGIC ABO: CPT

## 2025-07-23 PROCEDURE — C1889: CPT

## 2025-07-23 PROCEDURE — 71045 X-RAY EXAM CHEST 1 VIEW: CPT | Mod: 26

## 2025-07-23 PROCEDURE — 99291 CRITICAL CARE FIRST HOUR: CPT

## 2025-07-23 PROCEDURE — 83605 ASSAY OF LACTIC ACID: CPT

## 2025-07-23 PROCEDURE — 86850 RBC ANTIBODY SCREEN: CPT

## 2025-07-23 PROCEDURE — 80048 BASIC METABOLIC PNL TOTAL CA: CPT

## 2025-07-23 PROCEDURE — C1887: CPT

## 2025-07-23 PROCEDURE — 93005 ELECTROCARDIOGRAM TRACING: CPT

## 2025-07-23 PROCEDURE — 80053 COMPREHEN METABOLIC PANEL: CPT

## 2025-07-23 PROCEDURE — 82803 BLOOD GASES ANY COMBINATION: CPT

## 2025-07-23 PROCEDURE — 85730 THROMBOPLASTIN TIME PARTIAL: CPT

## 2025-07-23 PROCEDURE — 82330 ASSAY OF CALCIUM: CPT

## 2025-07-23 PROCEDURE — 33361 REPLACE AORTIC VALVE PERQ: CPT | Mod: 62,Q0

## 2025-07-23 PROCEDURE — C1760: CPT

## 2025-07-23 PROCEDURE — 85025 COMPLETE CBC W/AUTO DIFF WBC: CPT

## 2025-07-23 PROCEDURE — 84295 ASSAY OF SERUM SODIUM: CPT

## 2025-07-23 RX ORDER — NICARDIPINE HCL 30 MG
5 CAPSULE ORAL
Qty: 40 | Refills: 0 | Status: DISCONTINUED | OUTPATIENT
Start: 2025-07-23 | End: 2025-07-24

## 2025-07-23 RX ORDER — ACETAMINOPHEN 500 MG/5ML
1000 LIQUID (ML) ORAL ONCE
Refills: 0 | Status: DISCONTINUED | OUTPATIENT
Start: 2025-07-23 | End: 2025-07-24

## 2025-07-23 RX ORDER — AMIODARONE HYDROCHLORIDE 50 MG/ML
400 INJECTION, SOLUTION INTRAVENOUS
Refills: 0 | Status: DISCONTINUED | OUTPATIENT
Start: 2025-07-23 | End: 2025-07-24

## 2025-07-23 RX ORDER — BISACODYL 5 MG
10 TABLET, DELAYED RELEASE (ENTERIC COATED) ORAL ONCE
Refills: 0 | Status: DISCONTINUED | OUTPATIENT
Start: 2025-07-25 | End: 2025-07-24

## 2025-07-23 RX ORDER — POLYETHYLENE GLYCOL 3350 17 G/17G
17 POWDER, FOR SOLUTION ORAL DAILY
Refills: 0 | Status: DISCONTINUED | OUTPATIENT
Start: 2025-07-24 | End: 2025-07-24

## 2025-07-23 RX ORDER — NOREPINEPHRINE BITARTRATE 8 MG
0.04 SOLUTION INTRAVENOUS
Qty: 8 | Refills: 0 | Status: DISCONTINUED | OUTPATIENT
Start: 2025-07-23 | End: 2025-07-24

## 2025-07-23 RX ORDER — OXYCODONE HYDROCHLORIDE 30 MG/1
5 TABLET ORAL EVERY 4 HOURS
Refills: 0 | Status: DISCONTINUED | OUTPATIENT
Start: 2025-07-23 | End: 2025-07-24

## 2025-07-23 RX ORDER — ACETAMINOPHEN 500 MG/5ML
650 LIQUID (ML) ORAL EVERY 6 HOURS
Refills: 0 | Status: DISCONTINUED | OUTPATIENT
Start: 2025-07-23 | End: 2025-07-24

## 2025-07-23 RX ORDER — ACETAMINOPHEN 500 MG/5ML
650 LIQUID (ML) ORAL EVERY 6 HOURS
Refills: 0 | Status: CANCELLED | OUTPATIENT
Start: 2025-07-26 | End: 2025-07-24

## 2025-07-23 RX ORDER — SENNA 187 MG
2 TABLET ORAL AT BEDTIME
Refills: 0 | Status: DISCONTINUED | OUTPATIENT
Start: 2025-07-24 | End: 2025-07-24

## 2025-07-23 RX ORDER — HYDROMORPHONE/SOD CHLOR,ISO/PF 2 MG/10 ML
0.5 SYRINGE (ML) INJECTION EVERY 6 HOURS
Refills: 0 | Status: DISCONTINUED | OUTPATIENT
Start: 2025-07-23 | End: 2025-07-24

## 2025-07-23 RX ORDER — VANCOMYCIN HCL IN 5 % DEXTROSE 1.5G/250ML
1000 PLASTIC BAG, INJECTION (ML) INTRAVENOUS EVERY 12 HOURS
Refills: 0 | Status: COMPLETED | OUTPATIENT
Start: 2025-07-23 | End: 2025-07-24

## 2025-07-23 RX ORDER — CEFAZOLIN SODIUM IN 0.9 % NACL 3 G/100 ML
2000 INTRAVENOUS SOLUTION, PIGGYBACK (ML) INTRAVENOUS EVERY 8 HOURS
Refills: 0 | Status: COMPLETED | OUTPATIENT
Start: 2025-07-23 | End: 2025-07-24

## 2025-07-23 RX ADMIN — Medication 500 MILLIGRAM(S): at 18:57

## 2025-07-23 RX ADMIN — Medication 650 MILLIGRAM(S): at 19:08

## 2025-07-23 RX ADMIN — Medication 100 MILLIGRAM(S): at 21:42

## 2025-07-23 RX ADMIN — Medication 40 MILLIGRAM(S): at 21:42

## 2025-07-23 RX ADMIN — Medication 250 MILLIGRAM(S): at 18:57

## 2025-07-23 RX ADMIN — Medication 650 MILLIGRAM(S): at 18:57

## 2025-07-24 ENCOUNTER — TRANSCRIPTION ENCOUNTER (OUTPATIENT)
Age: 70
End: 2025-07-24

## 2025-07-24 ENCOUNTER — RESULT REVIEW (OUTPATIENT)
Age: 70
End: 2025-07-24

## 2025-07-24 VITALS
HEART RATE: 66 BPM | SYSTOLIC BLOOD PRESSURE: 118 MMHG | OXYGEN SATURATION: 97 % | RESPIRATION RATE: 25 BRPM | DIASTOLIC BLOOD PRESSURE: 57 MMHG

## 2025-07-24 LAB
ALBUMIN SERPL ELPH-MCNC: 3.7 G/DL — SIGNIFICANT CHANGE UP (ref 3.5–5.2)
ALP SERPL-CCNC: 83 U/L — SIGNIFICANT CHANGE UP (ref 30–115)
ALT FLD-CCNC: 11 U/L — SIGNIFICANT CHANGE UP (ref 0–41)
ANION GAP SERPL CALC-SCNC: 11 MMOL/L — SIGNIFICANT CHANGE UP (ref 7–14)
AST SERPL-CCNC: 20 U/L — SIGNIFICANT CHANGE UP (ref 0–41)
BASOPHILS # BLD AUTO: 0.02 K/UL — SIGNIFICANT CHANGE UP (ref 0–0.2)
BASOPHILS NFR BLD AUTO: 0.6 % — SIGNIFICANT CHANGE UP (ref 0–1)
BILIRUB SERPL-MCNC: 0.8 MG/DL — SIGNIFICANT CHANGE UP (ref 0.2–1.2)
BUN SERPL-MCNC: 11 MG/DL — SIGNIFICANT CHANGE UP (ref 10–20)
CALCIUM SERPL-MCNC: 8.2 MG/DL — LOW (ref 8.4–10.5)
CHLORIDE SERPL-SCNC: 102 MMOL/L — SIGNIFICANT CHANGE UP (ref 98–110)
CO2 SERPL-SCNC: 23 MMOL/L — SIGNIFICANT CHANGE UP (ref 17–32)
CREAT SERPL-MCNC: 0.8 MG/DL — SIGNIFICANT CHANGE UP (ref 0.7–1.5)
EGFR: 94 ML/MIN/1.73M2 — SIGNIFICANT CHANGE UP
EGFR: 94 ML/MIN/1.73M2 — SIGNIFICANT CHANGE UP
EOSINOPHIL # BLD AUTO: 0.11 K/UL — SIGNIFICANT CHANGE UP (ref 0–0.7)
EOSINOPHIL NFR BLD AUTO: 3.2 % — SIGNIFICANT CHANGE UP (ref 0–8)
GLUCOSE SERPL-MCNC: 78 MG/DL — SIGNIFICANT CHANGE UP (ref 70–99)
HCT VFR BLD CALC: 30.6 % — LOW (ref 42–52)
HGB BLD-MCNC: 9.5 G/DL — LOW (ref 14–18)
IMM GRANULOCYTES NFR BLD AUTO: 0.3 % — SIGNIFICANT CHANGE UP (ref 0.1–0.3)
LYMPHOCYTES # BLD AUTO: 0.36 K/UL — LOW (ref 1.2–3.4)
LYMPHOCYTES # BLD AUTO: 10.6 % — LOW (ref 20.5–51.1)
MAGNESIUM SERPL-MCNC: 1.9 MG/DL — SIGNIFICANT CHANGE UP (ref 1.8–2.4)
MCHC RBC-ENTMCNC: 22.2 PG — LOW (ref 27–31)
MCHC RBC-ENTMCNC: 31 G/DL — LOW (ref 32–37)
MCV RBC AUTO: 71.7 FL — LOW (ref 80–94)
MONOCYTES # BLD AUTO: 0.26 K/UL — SIGNIFICANT CHANGE UP (ref 0.1–0.6)
MONOCYTES NFR BLD AUTO: 7.6 % — SIGNIFICANT CHANGE UP (ref 1.7–9.3)
NEUTROPHILS # BLD AUTO: 2.65 K/UL — SIGNIFICANT CHANGE UP (ref 1.4–6.5)
NEUTROPHILS NFR BLD AUTO: 77.7 % — HIGH (ref 42.2–75.2)
NRBC BLD AUTO-RTO: 0 /100 WBCS — SIGNIFICANT CHANGE UP (ref 0–0)
PLATELET # BLD AUTO: 145 K/UL — SIGNIFICANT CHANGE UP (ref 130–400)
PMV BLD: 10.8 FL — HIGH (ref 7.4–10.4)
POTASSIUM SERPL-MCNC: 3.8 MMOL/L — SIGNIFICANT CHANGE UP (ref 3.5–5)
POTASSIUM SERPL-SCNC: 3.8 MMOL/L — SIGNIFICANT CHANGE UP (ref 3.5–5)
PROT SERPL-MCNC: 5.6 G/DL — LOW (ref 6–8)
RBC # BLD: 4.27 M/UL — LOW (ref 4.7–6.1)
RBC # FLD: 16.8 % — HIGH (ref 11.5–14.5)
SODIUM SERPL-SCNC: 136 MMOL/L — SIGNIFICANT CHANGE UP (ref 135–146)
WBC # BLD: 3.41 K/UL — LOW (ref 4.8–10.8)
WBC # FLD AUTO: 3.41 K/UL — LOW (ref 4.8–10.8)

## 2025-07-24 PROCEDURE — 93010 ELECTROCARDIOGRAM REPORT: CPT

## 2025-07-24 PROCEDURE — 71045 X-RAY EXAM CHEST 1 VIEW: CPT | Mod: 26

## 2025-07-24 PROCEDURE — 93306 TTE W/DOPPLER COMPLETE: CPT | Mod: 26

## 2025-07-24 PROCEDURE — 99232 SBSQ HOSP IP/OBS MODERATE 35: CPT

## 2025-07-24 RX ORDER — ACETAMINOPHEN 500 MG/5ML
2 LIQUID (ML) ORAL
Qty: 0 | Refills: 0 | DISCHARGE
Start: 2025-07-24

## 2025-07-24 RX ORDER — MAGNESIUM SULFATE 500 MG/ML
1 SYRINGE (ML) INJECTION ONCE
Refills: 0 | Status: COMPLETED | OUTPATIENT
Start: 2025-07-24 | End: 2025-07-24

## 2025-07-24 RX ADMIN — Medication 100 MILLIGRAM(S): at 13:22

## 2025-07-24 RX ADMIN — Medication 650 MILLIGRAM(S): at 06:03

## 2025-07-24 RX ADMIN — Medication 500 MILLIGRAM(S): at 06:03

## 2025-07-24 RX ADMIN — Medication 1 APPLICATION(S): at 11:43

## 2025-07-24 RX ADMIN — Medication 650 MILLIGRAM(S): at 11:43

## 2025-07-24 RX ADMIN — Medication 100 GRAM(S): at 06:02

## 2025-07-24 RX ADMIN — AMIODARONE HYDROCHLORIDE 400 MILLIGRAM(S): 50 INJECTION, SOLUTION INTRAVENOUS at 06:03

## 2025-07-24 RX ADMIN — POLYETHYLENE GLYCOL 3350 17 GRAM(S): 17 POWDER, FOR SOLUTION ORAL at 11:43

## 2025-07-24 RX ADMIN — Medication 20 MILLIEQUIVALENT(S): at 06:03

## 2025-07-24 RX ADMIN — Medication 650 MILLIGRAM(S): at 00:23

## 2025-07-24 RX ADMIN — Medication 50 MILLIEQUIVALENT(S): at 06:02

## 2025-07-24 RX ADMIN — Medication 250 MILLIGRAM(S): at 06:01

## 2025-07-24 RX ADMIN — Medication 100 MILLIGRAM(S): at 06:02

## 2025-07-25 ENCOUNTER — APPOINTMENT (OUTPATIENT)
Dept: CARE COORDINATION | Facility: HOME HEALTH | Age: 70
End: 2025-07-25

## 2025-07-26 ENCOUNTER — TRANSCRIPTION ENCOUNTER (OUTPATIENT)
Age: 70
End: 2025-07-26

## 2025-07-26 VITALS
SYSTOLIC BLOOD PRESSURE: 120 MMHG | DIASTOLIC BLOOD PRESSURE: 72 MMHG | OXYGEN SATURATION: 98 % | RESPIRATION RATE: 12 BRPM | HEART RATE: 85 BPM

## 2025-07-31 ENCOUNTER — NON-APPOINTMENT (OUTPATIENT)
Age: 70
End: 2025-07-31

## 2025-07-31 ENCOUNTER — APPOINTMENT (OUTPATIENT)
Dept: CARDIOTHORACIC SURGERY | Facility: CLINIC | Age: 70
End: 2025-07-31
Payer: MEDICARE

## 2025-07-31 VITALS
OXYGEN SATURATION: 94 % | WEIGHT: 165 LBS | TEMPERATURE: 97.6 F | DIASTOLIC BLOOD PRESSURE: 62 MMHG | HEIGHT: 66 IN | RESPIRATION RATE: 16 BRPM | SYSTOLIC BLOOD PRESSURE: 137 MMHG | HEART RATE: 56 BPM | BODY MASS INDEX: 26.52 KG/M2

## 2025-07-31 DIAGNOSIS — I25.10 ATHEROSCLEROTIC HEART DISEASE OF NATIVE CORONARY ARTERY W/OUT ANGINA PECTORIS: ICD-10-CM

## 2025-07-31 PROCEDURE — 99214 OFFICE O/P EST MOD 30 MIN: CPT

## 2025-08-01 DIAGNOSIS — E78.00 PURE HYPERCHOLESTEROLEMIA, UNSPECIFIED: ICD-10-CM

## 2025-08-01 DIAGNOSIS — I35.0 NONRHEUMATIC AORTIC (VALVE) STENOSIS: ICD-10-CM

## 2025-08-01 DIAGNOSIS — Z79.82 LONG TERM (CURRENT) USE OF ASPIRIN: ICD-10-CM

## 2025-08-01 DIAGNOSIS — I10 ESSENTIAL (PRIMARY) HYPERTENSION: ICD-10-CM

## 2025-08-01 DIAGNOSIS — I25.10 ATHEROSCLEROTIC HEART DISEASE OF NATIVE CORONARY ARTERY WITHOUT ANGINA PECTORIS: ICD-10-CM

## 2025-08-01 DIAGNOSIS — F17.210 NICOTINE DEPENDENCE, CIGARETTES, UNCOMPLICATED: ICD-10-CM

## 2025-08-01 DIAGNOSIS — Z79.02 LONG TERM (CURRENT) USE OF ANTITHROMBOTICS/ANTIPLATELETS: ICD-10-CM

## 2025-08-01 DIAGNOSIS — Z95.5 PRESENCE OF CORONARY ANGIOPLASTY IMPLANT AND GRAFT: ICD-10-CM

## 2025-08-18 ENCOUNTER — APPOINTMENT (OUTPATIENT)
Dept: CARDIOLOGY | Facility: CLINIC | Age: 70
End: 2025-08-18
Payer: MEDICARE

## 2025-08-18 VITALS
HEIGHT: 66 IN | BODY MASS INDEX: 26.2 KG/M2 | SYSTOLIC BLOOD PRESSURE: 132 MMHG | DIASTOLIC BLOOD PRESSURE: 59 MMHG | WEIGHT: 163 LBS

## 2025-08-18 DIAGNOSIS — E78.5 HYPERLIPIDEMIA, UNSPECIFIED: ICD-10-CM

## 2025-08-18 DIAGNOSIS — I45.2 BIFASCICULAR BLOCK: ICD-10-CM

## 2025-08-18 DIAGNOSIS — I35.0 NONRHEUMATIC AORTIC (VALVE) STENOSIS: ICD-10-CM

## 2025-08-18 DIAGNOSIS — N52.9 MALE ERECTILE DYSFUNCTION, UNSPECIFIED: ICD-10-CM

## 2025-08-18 PROBLEM — Z95.2 S/P TAVR (TRANSCATHETER AORTIC VALVE REPLACEMENT): Status: ACTIVE | Noted: 2025-07-26

## 2025-08-18 PROCEDURE — 99214 OFFICE O/P EST MOD 30 MIN: CPT

## 2025-08-18 PROCEDURE — G2211 COMPLEX E/M VISIT ADD ON: CPT

## 2025-08-18 RX ORDER — SILDENAFIL 50 MG/1
50 TABLET ORAL
Qty: 10 | Refills: 0 | Status: ACTIVE | COMMUNITY
Start: 2025-08-18 | End: 1900-01-01

## 2025-08-22 ENCOUNTER — TRANSCRIPTION ENCOUNTER (OUTPATIENT)
Age: 70
End: 2025-08-22

## 2025-08-29 ENCOUNTER — APPOINTMENT (OUTPATIENT)
Dept: CARDIOTHORACIC SURGERY | Facility: CLINIC | Age: 70
End: 2025-08-29
Payer: MEDICARE

## 2025-08-29 ENCOUNTER — APPOINTMENT (OUTPATIENT)
Dept: CARDIOLOGY | Facility: CLINIC | Age: 70
End: 2025-08-29
Payer: MEDICARE

## 2025-08-29 VITALS
DIASTOLIC BLOOD PRESSURE: 77 MMHG | TEMPERATURE: 98 F | OXYGEN SATURATION: 98 % | HEIGHT: 66 IN | SYSTOLIC BLOOD PRESSURE: 133 MMHG | RESPIRATION RATE: 16 BRPM | HEART RATE: 59 BPM | WEIGHT: 163 LBS | BODY MASS INDEX: 26.2 KG/M2

## 2025-08-29 DIAGNOSIS — Z86.2 PERSONAL HISTORY OF DISEASES OF THE BLOOD AND BLOOD-FORMING ORGANS AND CERTAIN DISORDERS INVOLVING THE IMMUNE MECHANISM: ICD-10-CM

## 2025-08-29 PROCEDURE — 99214 OFFICE O/P EST MOD 30 MIN: CPT

## 2025-08-29 PROCEDURE — 93306 TTE W/DOPPLER COMPLETE: CPT

## 2025-09-04 ENCOUNTER — APPOINTMENT (OUTPATIENT)
Dept: ELECTROPHYSIOLOGY | Facility: CLINIC | Age: 70
End: 2025-09-04
Payer: MEDICARE

## 2025-09-04 VITALS
WEIGHT: 165 LBS | DIASTOLIC BLOOD PRESSURE: 80 MMHG | HEIGHT: 66 IN | SYSTOLIC BLOOD PRESSURE: 130 MMHG | HEART RATE: 83 BPM | BODY MASS INDEX: 26.52 KG/M2

## 2025-09-04 DIAGNOSIS — Z78.9 OTHER SPECIFIED HEALTH STATUS: ICD-10-CM

## 2025-09-04 DIAGNOSIS — I25.10 ATHEROSCLEROTIC HEART DISEASE OF NATIVE CORONARY ARTERY W/OUT ANGINA PECTORIS: ICD-10-CM

## 2025-09-04 DIAGNOSIS — Z83.3 FAMILY HISTORY OF DIABETES MELLITUS: ICD-10-CM

## 2025-09-04 DIAGNOSIS — R94.31 ABNORMAL ELECTROCARDIOGRAM [ECG] [EKG]: ICD-10-CM

## 2025-09-04 DIAGNOSIS — Z95.2 PRESENCE OF PROSTHETIC HEART VALVE: ICD-10-CM

## 2025-09-04 DIAGNOSIS — I45.2 BIFASCICULAR BLOCK: ICD-10-CM

## 2025-09-04 DIAGNOSIS — Z82.49 FAMILY HISTORY OF ISCHEMIC HEART DISEASE AND OTHER DISEASES OF THE CIRCULATORY SYSTEM: ICD-10-CM

## 2025-09-04 PROCEDURE — 99204 OFFICE O/P NEW MOD 45 MIN: CPT

## 2025-09-04 PROCEDURE — 93000 ELECTROCARDIOGRAM COMPLETE: CPT

## 2025-09-10 ENCOUNTER — RX RENEWAL (OUTPATIENT)
Age: 70
End: 2025-09-10